# Patient Record
Sex: FEMALE | Race: ASIAN | Employment: PART TIME | URBAN - METROPOLITAN AREA
[De-identification: names, ages, dates, MRNs, and addresses within clinical notes are randomized per-mention and may not be internally consistent; named-entity substitution may affect disease eponyms.]

---

## 2018-01-22 ENCOUNTER — OFFICE VISIT (OUTPATIENT)
Dept: INTERNAL MEDICINE CLINIC | Age: 23
End: 2018-01-22

## 2018-01-22 VITALS
OXYGEN SATURATION: 100 % | HEART RATE: 83 BPM | WEIGHT: 119 LBS | DIASTOLIC BLOOD PRESSURE: 67 MMHG | TEMPERATURE: 97.8 F | HEIGHT: 63 IN | RESPIRATION RATE: 16 BRPM | SYSTOLIC BLOOD PRESSURE: 117 MMHG | BODY MASS INDEX: 21.09 KG/M2

## 2018-01-22 DIAGNOSIS — K59.00 CONSTIPATION, UNSPECIFIED CONSTIPATION TYPE: ICD-10-CM

## 2018-01-22 DIAGNOSIS — R10.2 PELVIC PAIN: ICD-10-CM

## 2018-01-22 DIAGNOSIS — R19.7 DIARRHEA, UNSPECIFIED TYPE: ICD-10-CM

## 2018-01-22 DIAGNOSIS — R10.9 ABDOMINAL PAIN, UNSPECIFIED ABDOMINAL LOCATION: Primary | ICD-10-CM

## 2018-01-22 LAB
BILIRUB UR QL STRIP: NEGATIVE
GLUCOSE UR-MCNC: NEGATIVE MG/DL
KETONES P FAST UR STRIP-MCNC: NEGATIVE MG/DL
PH UR STRIP: 7 [PH] (ref 4.6–8)
PROT UR QL STRIP: NEGATIVE
SP GR UR STRIP: 1.02 (ref 1–1.03)
UA UROBILINOGEN AMB POC: NORMAL (ref 0.2–1)
URINALYSIS CLARITY POC: CLEAR
URINALYSIS COLOR POC: YELLOW
URINE BLOOD POC: NORMAL
URINE LEUKOCYTES POC: NEGATIVE
URINE NITRITES POC: NEGATIVE

## 2018-01-22 RX ORDER — IBUPROFEN 200 MG
TABLET ORAL
COMMUNITY

## 2018-01-22 RX ORDER — LUBIPROSTONE 8 UG/1
8 CAPSULE, GELATIN COATED ORAL 2 TIMES DAILY WITH MEALS
Qty: 60 CAP | Refills: 0 | Status: SHIPPED | OUTPATIENT
Start: 2018-01-22 | End: 2020-04-16

## 2018-01-22 NOTE — PATIENT INSTRUCTIONS
Irritable Bowel Syndrome: Care Instructions  Your Care Instructions  Irritable bowel syndrome, or IBS, is a problem with the intestines that causes belly pain, bloating, gas, constipation, and diarrhea. The cause of IBS is not well known. IBS can last for many years, but it does not get worse over time or lead to serious disease. Most people can control their symptoms by changing their diet and reducing stress. Follow-up care is a key part of your treatment and safety. Be sure to make and go to all appointments, and call your doctor if you are having problems. It's also a good idea to know your test results and keep a list of the medicines you take. How can you care for yourself at home? · For constipation:  ¨ Include fruits, vegetables, beans, and whole grains in your diet each day. These foods are high in fiber. ¨ Drink plenty of fluids, enough so that your urine is light yellow or clear like water. If you have kidney, heart, or liver disease and have to limit fluids, talk with your doctor before you increase the amount of fluids you drink. ¨ Get some exercise every day. Build up slowly to 30 to 60 minutes a day on 5 or more days of the week. ¨ Take a fiber supplement, such as Citrucel or Metamucil, every day if needed. Read and follow all instructions on the label. ¨ Schedule time each day for a bowel movement. Having a daily routine may help. Take your time and do not strain when having a bowel movement. · If you often have diarrhea, limit foods and drinks that make it worse. These are different for each person but may include caffeine (found in coffee, tea, chocolate, and cola drinks), alcohol, fatty foods, gas-producing foods (such as beans, cabbage, and broccoli), some dairy products, and spicy foods. Do not eat candy or gum that contains sorbitol. · Keep a daily diary of what you eat and what symptoms you have. This may help find foods that cause you problems. · Eat slowly.  Try to make mealtime relaxing. · Find ways to reduce stress. · Get at least 30 minutes of exercise on most days of the week. Exercise can help reduce tension and prevent constipation. Walking is a good choice. You also may want to do other activities, such as running, swimming, cycling, or playing tennis or team sports. When should you call for help? Call your doctor now or seek immediate medical care if:  ? · Your pain is different than usual or occurs with fever. ? · You lose weight without trying, or you lose your appetite and you do not know why.   ? · Your symptoms often wake you from sleep. ? · Your stools are black and tarlike or have streaks of blood. ? Watch closely for changes in your health, and be sure to contact your doctor if:  ? · Your IBS symptoms get worse or begin to disrupt your day-to-day life. ? · You become more tired than usual.   ? · Your home treatment stops working. Where can you learn more? Go to http://nannette-maddie.info/. Enter Y423 in the search box to learn more about \"Irritable Bowel Syndrome: Care Instructions. \"  Current as of: May 12, 2017  Content Version: 11.4  © 4070-9556 [employer]. Care instructions adapted under license by [employer] (which disclaims liability or warranty for this information). If you have questions about a medical condition or this instruction, always ask your healthcare professional. [address] any warranty or liability for your use of this information.

## 2018-01-22 NOTE — MR AVS SNAPSHOT
[address]  [place] [address] 
[phone] Patient: Gianna Adams MRN: [id number] [facility]:4/72/1902 Visit Information Date & Time Provider Department Dept. Phone Encounter #  
 1/22/2018 10:30 AM Jennifer Seymour NP [facility] [phone] [id number] Follow-up Instructions Return in about 4 weeks (around 2/19/2018), or if symptoms worsen or fail to improve. Upcoming Health Maintenance Date Due Hepatitis A Peds Age 1-18 (1 of 2 - Standard Series) 5/13/1998 DTaP/Tdap/Td series (1 - Tdap) 5/13/2004 HPV AGE 9Y-26Y (1 of 3 - Female 3 Dose Series) 5/13/2008 Influenza Age 5 to Adult 8/1/2017 Allergies as of 1/22/2018  Review Complete On: 1/22/2018 By: Jennifer Seymour NP No Known Allergies Current Immunizations  Never Reviewed No immunizations on file. Not reviewed this visit You Were Diagnosed With   
  
 Codes Comments Abdominal pain, unspecified abdominal location    -  Primary ICD-10-CM: R10.9 ICD-9-CM: 789.00 Diarrhea, unspecified type     ICD-10-CM: R19.7 ICD-9-CM: 787.91 Constipation, unspecified constipation type     ICD-10-CM: K59.00 ICD-9-CM: 564.00 Pelvic pain     ICD-10-CM: R10.2 ICD-9-CM: [id number] Vitals BP Pulse Temp Resp Height(growth percentile) Weight(growth percentile)  
 117/67 (BP 1 Location: Right arm, BP Patient Position: Sitting) 83 97.8 °F (36.6 °C) (Oral) 16 5' 3\" (1.6 m) 119 lb (54 kg) LMP SpO2 BMI OB Status Smoking Status 01/15/2018 (Approximate) 100% 21.08 kg/m2 Having regular periods Never Smoker BMI and BSA Data Body Mass Index Body Surface Area 21.08 kg/m 2 1.55 m 2 Your Updated Medication List  
  
   
This list is accurate as of: 1/22/18 11:41 AM.  Always use your most recent med list. ADVIL 200 mg tablet Generic drug:  ibuprofen Take  by mouth. linaclotide 145 mcg Cap capsule Commonly known as:  Lenny Roch Take 1 Cap by mouth Daily (before breakfast). lubiPROStone 8 mcg capsule Commonly known as:  Bhavana Tim Take 1 Cap by mouth two (2) times daily (with meals). Indications: chronic idiopathic constipation Prescriptions Printed Refills  
 linaclotide (LINZESS) 145 mcg cap capsule 3 Sig: Take 1 Cap by mouth Daily (before breakfast). Class: Print Route: Oral  
 lubiPROStone (AMITIZA) 8 mcg capsule 0 Sig: Take 1 Cap by mouth two (2) times daily (with meals). Indications: chronic idiopathic constipation Class: Print Route: Oral  
  
We Performed the Following AMB POC URINALYSIS DIP STICK AUTO W/O MICRO [96911 CPT(R)] Follow-up Instructions Return in about 4 weeks (around 2/19/2018), or if symptoms worsen or fail to improve. Patient Instructions Irritable Bowel Syndrome: Care Instructions Your Care Instructions Irritable bowel syndrome, or IBS, is a problem with the intestines that causes belly pain, bloating, gas, constipation, and diarrhea. The cause of IBS is not well known. IBS can last for many years, but it does not get worse over time or lead to serious disease. Most people can control their symptoms by changing their diet and reducing stress. Follow-up care is a key part of your treatment and safety. Be sure to make and go to all appointments, and call your doctor if you are having problems. It's also a good idea to know your test results and keep a list of the medicines you take. How can you care for yourself at home? · For constipation: 
¨ Include fruits, vegetables, beans, and whole grains in your diet each day. These foods are high in fiber. ¨ Drink plenty of fluids, enough so that your urine is light yellow or clear like water.  If you have kidney, heart, or liver disease and have to limit fluids, talk with your doctor before you increase the amount of fluids you drink. ¨ Get some exercise every day. Build up slowly to 30 to 60 minutes a day on 5 or more days of the week. ¨ Take a fiber supplement, such as Citrucel or Metamucil, every day if needed. Read and follow all instructions on the label. ¨ Schedule time each day for a bowel movement. Having a daily routine may help. Take your time and do not strain when having a bowel movement. · If you often have diarrhea, limit foods and drinks that make it worse. These are different for each person but may include caffeine (found in coffee, tea, chocolate, and cola drinks), alcohol, fatty foods, gas-producing foods (such as beans, cabbage, and broccoli), some dairy products, and spicy foods. Do not eat candy or gum that contains sorbitol. · Keep a daily diary of what you eat and what symptoms you have. This may help find foods that cause you problems. · Eat slowly. Try to make mealtime relaxing. · Find ways to reduce stress. · Get at least 30 minutes of exercise on most days of the week. Exercise can help reduce tension and prevent constipation. Walking is a good choice. You also may want to do other activities, such as running, swimming, cycling, or playing tennis or team sports. When should you call for help? Call your doctor now or seek immediate medical care if: 
? · Your pain is different than usual or occurs with fever. ? · You lose weight without trying, or you lose your appetite and you do not know why.  
? · Your symptoms often wake you from sleep. ? · Your stools are black and tarlike or have streaks of blood. ? Watch closely for changes in your health, and be sure to contact your doctor if: 
? · Your IBS symptoms get worse or begin to disrupt your day-to-day life. ? · You become more tired than usual.  
? · Your home treatment stops working. Where can you learn more? Go to http://nannette-maddie.info/.  
Enter W130 in the search box to learn more about \"Irritable Bowel Syndrome: Care Instructions. \" Current as of: May 12, 2017 Content Version: 11.4 © 2073-9615 [employer]. Care instructions adapted under license by [employer] (which disclaims liability or warranty for this information). If you have questions about a medical condition or this instruction, always ask your healthcare professional. [address]  any warranty or liability for your use of this information. Introducing [facility]! Shila De La Rosa introduces [employer] patient portal. Now you can access parts of your medical record, email your doctor's office, and request medication refills online. 1. In your internet browser, go to [email or web address] 2. Click on the First Time User? Click Here link in the Sign In box. You will see the New Member Sign Up page. 3. Enter your [employer] Access Code exactly as it appears below. You will not need to use this code after youve completed the sign-up process. If you do not sign up before the expiration date, you must request a new code. · [employer] Access Code: [email or web address] Expires: 4/22/2018 11:07 AM 
 
4. Enter the last four digits of your Social Security Number (xxxx) and Date of Birth (mm/dd/yyyy) as indicated and click Submit. You will be taken to the next sign-up page. 5. Create a Accruit ID. This will be your Accruit login ID and cannot be changed, so think of one that is secure and easy to remember. 6. Create a [employer] password. You can change your password at any time. 7. Enter your Password Reset Question and Answer. This can be used at a later time if you forget your password. 8. Enter your e-mail address. You will receive e-mail notification when new information is available in [address] 9. Click Sign Up. You can now view and download portions of your medical record. 10. Click the Download Summary menu link to download a portable copy of your medical information. If you have questions, please visit the Frequently Asked Questions section of the [employer] website. Remember, [employer] is NOT to be used for urgent needs. For medical emergencies, dial 911. Now available from your iPhone and Android! Please provide this summary of care documentation to your next provider. If you have any questions after today's visit, please call [phone].

## 2018-01-22 NOTE — PROGRESS NOTES
RM#9  Chief Complaint   Patient presents with    Complete Physical     Results for orders placed or performed in visit on 01/22/18   AMB POC URINALYSIS DIP STICK AUTO W/O MICRO   Result Value Ref Range    Color (UA POC) Yellow     Clarity (UA POC) Clear     Glucose (UA POC) Negative Negative    Bilirubin (UA POC) Negative Negative    Ketones (UA POC) Negative Negative    Specific gravity (UA POC) 1.020 1.001 - 1.035    Blood (UA POC) Trace Negative    pH (UA POC) 7.0 4.6 - 8.0    Protein (UA POC) Negative Negative    Urobilinogen (UA POC) 0.2 mg/dL 0.2 - 1    Nitrites (UA POC) Negative Negative    Leukocyte esterase (UA POC) Negative Negative       1. Have you been to the ER, urgent care clinic since your last visit? Hospitalized since your last visit? No    2. Have you seen or consulted any other health care providers outside of the [address] [place] [address] since your last visit? Include any pap smears or colon screening. No  There are no preventive care reminders to display for this patient. Health Maintenance reviewed - n/a.

## 2018-01-29 NOTE — PROGRESS NOTES
HISTORY OF PRESENT ILLNESS  Phong hutton a 23 y.o. female presents to establish care  HPI  Abdominal bloating, abdominal pain daily,  diarrhea and constipation for last few months. changing diet; more fiber and healthier foods, this work temporarily    poor appetite     Had digestive problems as child especially constipation. 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 Lives at home with parents. Pharmacy student at [employer]    She does not drink nor smoke, adequate sleep. Denies depression,  +situational anxiety        History reviewed. No pertinent past medical history. No current outpatient prescriptions on file prior to visit. No current facility-administered medications on file prior to visit. History reviewed. No pertinent surgical history. Family History   Problem Relation Age of Onset    Diabetes Father     No Known Problems Brother     No Known Problems Maternal Grandmother      ROS  Negative except as noted in HPI  Physical Exam  Well appearing female in NAD  Skin: normal  HEENT: glasses, good dentition  Neck: no adenopathy or thyromegaly, supple  Lungs: CTA  Heart: normal rate and rhythm, no m/r/g, no edema,   Abdomen: symmetrical, normo active bowel sounds, soft, non tender, no palpable lesion  Extremities:  Symmetrical, no deformity, full ROM  Psy; anxious, cooperative, friendly      Visit Vitals    /67 (BP 1 Location: Right arm, BP Patient Position: Sitting)    Pulse 83    Temp 97.8 °F (36.6 °C) (Oral)    Resp 16    Ht 5' 3\" (1.6 m)    Wt 119 lb (54 kg)    LMP 01/15/2018 (Approximate)    SpO2 100%    BMI 21.08 kg/m2     ASSESSMENT and PLAN    ICD-10-CM ICD-9-CM    1. Abdominal pain, unspecified abdominal location R10.9 789.00 lubiPROStone (AMITIZA) 8 mcg capsule   2. Diarrhea, unspecified type R19.7 787.91    3. Constipation, unspecified constipation type K59.00 564.00 lubiPROStone (AMITIZA) 8 mcg capsule   4. Pelvic pain R10.2 [id number] AMB POC URINALYSIS DIP STICK AUTO W/O MICRO     Follow-up Disposition:  Return in about 4 weeks (around 2/19/2018), or if symptoms worsen or fail to improve. reviewed diet, exercise and weight control  reviewed medications and side effects in detail    Discussed likely IBS, treatment options, referral. She has tried high fiber without success. Trial of above medication, advised of possible high cost of medication    Patient voices understanding and acceptance of this advice and will call back if any further questions or concerns. An After Visit Summary was printed and given to the patient.     Negative urine dip

## 2020-04-14 ENCOUNTER — E-VISIT (OUTPATIENT)
Dept: INTERNAL MEDICINE CLINIC | Age: 25
End: 2020-04-14

## 2020-04-14 ENCOUNTER — TELEPHONE (OUTPATIENT)
Dept: INTERNAL MEDICINE CLINIC | Age: 25
End: 2020-04-14

## 2020-04-14 NOTE — TELEPHONE ENCOUNTER
E-visit submitted for period pains, as well as gassy stomach cramping. Patient would be better served by having a true visit as I am not able to answer her questions well just through a ApplyMap. Please call and schedule virtual visit.      Thanks  Carmen Farrell MD

## 2020-04-16 ENCOUNTER — VIRTUAL VISIT (OUTPATIENT)
Dept: INTERNAL MEDICINE CLINIC | Age: 25
End: 2020-04-16

## 2020-04-16 DIAGNOSIS — L65.9 HAIR LOSS: ICD-10-CM

## 2020-04-16 DIAGNOSIS — R63.4 WEIGHT LOSS: ICD-10-CM

## 2020-04-16 DIAGNOSIS — R53.83 FATIGUE, UNSPECIFIED TYPE: ICD-10-CM

## 2020-04-16 DIAGNOSIS — R10.13 EPIGASTRIC PAIN: Primary | ICD-10-CM

## 2020-04-16 RX ORDER — PANTOPRAZOLE SODIUM 40 MG/1
40 TABLET, DELAYED RELEASE ORAL DAILY
Qty: 60 TAB | Refills: 0 | Status: SHIPPED | OUTPATIENT
Start: 2020-04-16 | End: 2020-06-09

## 2020-04-16 NOTE — PROGRESS NOTES
Zach Salinas is a 27 y.o. female who was seen by synchronous (real-time) audio-video technology on 4/16/2020. Pursuant to the emergency declaration under the [address] and [place] [address] [facility] authority and the [employer] Act, this Virtual Visit was conducted, with patient's consent, to reduce the patient's risk of exposure to COVID-19 and provide continuity of care for an established patient. Services were provided through a video synchronous discussion virtually to substitute for in-person appointment. Consent:  She and/or her healthcare decision maker is aware that this patient-initiated Telehealth encounter is a billable service, with coverage as determined by her insurance carrier. She is aware that she may receive a bill and has provided verbal consent to proceed: Yes    I was in the office while conducting this encounter. Assessment & Plan:   Diagnoses and all orders for this visit:    1. Epigastric pain  -     pantoprazole (PROTONIX) 40 mg tablet; Take 1 Tab by mouth daily.  -     CBC W/O DIFF  -     TSH RFX ON ABNORMAL TO FREE T4  -     METABOLIC PANEL, COMPREHENSIVE  -     IRON PROFILE  -     FERRITIN  -     LIPASE    2. Hair loss  -     CBC W/O DIFF  -     TSH RFX ON ABNORMAL TO FREE T4  -     METABOLIC PANEL, COMPREHENSIVE  -     IRON PROFILE  -     FERRITIN  -     LIPASE    3. Weight loss  -     CBC W/O DIFF  -     TSH RFX ON ABNORMAL TO FREE T4  -     METABOLIC PANEL, COMPREHENSIVE  -     IRON PROFILE  -     FERRITIN  -     LIPASE    4. Fatigue, unspecified type  -     CBC W/O DIFF  -     TSH RFX ON ABNORMAL TO FREE T4  -     METABOLIC PANEL, COMPREHENSIVE  -     IRON PROFILE  -     FERRITIN  -     LIPASE    Epigastric pain: discussed that differential is broad. However does seem to be digestive in nature. - trial treatment with PPI. - screen for other sources of pain as noted above.      History of anemia hair loss and weight loss   - screen for thyroid abn, and iron deficiency. 712  Subjective:   Sunitha Rojas was seen for Abdominal Pain (Started about 2 days ago, \"felt like a buring sharp pain in the middle of stomach\" 8/10, pain comes and goes. Denies any vomiting but states when pain comes it causes some nausea)    Reports that she is having abdominal pain ongoing for 2 days. - started Monday night, woke up around 4am with severe pain, then kept increasing Monday-Tuesday. Period started today. Pain has diminished by a lot. No sexual partners. Feeling pain up towards the umbilicus. Pain was steady for first few days then became more like waves. And associated with eating. Pain also happenned in January also around the same time that week. No vomiting. But ++ nausea. Often will get nausea and also uncomfortable pain in stomach. Bowel movements have been normal for her. Pain a little relieved by bowel movements. Did feel some radiating into chest.     Has not seen any blood in stool, nor black stools. Thinks she has been losing weight. Not trying to lose weight. Current weight may be 104 pounds - trying to eat more. Tried taking tums and this did seem to help. Takes ibuprofen rarely for     Prior to Admission medications    Medication Sig Start Date End Date Taking? Authorizing Provider   ibuprofen (ADVIL) 200 mg tablet Take  by mouth. Provider, Historical   linaclotide (LINZESS) 145 mcg cap capsule Take 1 Cap by mouth Daily (before breakfast). 1/22/18   Tati Gonzalez NP   lubiPROStone (AMITIZA) 8 mcg capsule Take 1 Cap by mouth two (2) times daily (with meals). Indications: chronic idiopathic constipation 1/22/18   Majo SCHREIBER, NP     No Known Allergies    Review of Systems   Constitutional: Negative for chills, fever and malaise/fatigue. Respiratory: Negative for shortness of breath. Cardiovascular: Negative for chest pain.      PHYSICAL EXAMINATION:    Vital Signs: (As obtained by patient/caregiver at home)  Visit Vitals  LMP 04/16/2020 Comment: last cycle 3/22/20         Constitutional: [x] Appears well-developed and well-nourished [x] No apparent distress      Mental status: [x] Alert and awake  [x] Oriented to person/place/time [x] Able to follow commands      Eyes:   EOM    [x]  Normal      Sclera  [x]  Normal              Discharge [x]  None visible       HENT: [x] Normocephalic, atraumatic    [x] Mouth/Throat: Mucous membranes are moist    External Ears [x] Normal      Neck: [x] No visualized mass     Pulmonary/Chest: [x] Respiratory effort normal   [x] No visualized signs of difficulty breathing or respiratory distress         Musculoskeletal:   [x] Normal gait with no signs of ataxia         [x] Normal range of motion of neck        Neurological:        [x] No Facial Asymmetry (Cranial nerve 7 motor function) (limited exam due to video visit)          [x] No gaze palsy              Skin:        [x] No significant exanthematous lesions or discoloration noted on facial skin              Psychiatric:       [x] Normal Affect        [x] Normal behavior    Other pertinent observable physical exam findings:- none    We discussed the expected course, resolution and complications of the diagnosis(es) in detail. Medication risks, benefits, costs, interactions, and alternatives were discussed as indicated. I advised her to contact the office if her condition worsens, changes or fails to improve as anticipated. She expressed understanding with the diagnosis(es) and plan. Pursuant to the emergency declaration under the [facility], [facility] waiver authority and the [employer] and [employer] Act, this Virtual  Visit was conducted, with patient's consent, to reduce the patient's risk of exposure to COVID-19 and provide continuity of care for an established patient.      Services were provided through a video synchronous discussion virtually to substitute for in-person clinic visit.     Romayne Cord, MD

## 2020-04-16 NOTE — PATIENT INSTRUCTIONS
I am glad we were able to meet earlier today. Please call and schedule your follow-up appointment soon so you are able to get the date and time you need. I recommend:  
1) for stomach pain: trial treatment with a stomach acid supression medication. I have sent pantoprazole 40mg to pharmacy. 2) [facility] Please do not hesitate to contact the office if any new questions or concerns. All the best,  
  
   Dr. Silas Olivo Gastritis: Care Instructions Your Care Instructions Gastritis is a sore and upset stomach. It happens when something irritates the stomach lining. Many things can cause it. These include an infection such as the flu or something you ate or drank. Medicines or a sore on the lining of the stomach (ulcer) also can cause it. Your belly may bloat and ache. You may belch, vomit, and feel sick to your stomach. You should be able to relieve the problem by taking medicine. And it may help to change your diet. If gastritis lasts, your doctor may prescribe medicine. Follow-up care is a key part of your treatment and safety. Be sure to make and go to all appointments, and call your doctor if you are having problems. It's also a good idea to know your test results and keep a list of the medicines you take. How can you care for yourself at home? · If your doctor prescribed antibiotics, take them as directed. Do not stop taking them just because you feel better. You need to take the full course of antibiotics. · Be safe with medicines. If your doctor prescribed medicine to decrease stomach acid, take it as directed. Call your doctor if you think you are having a problem with your medicine.  
· Do not take any other medicine, including over-the-counter pain relievers, without talking to your doctor first. 
· If your doctor recommends over-the-counter medicine to reduce stomach acid, such as Pepcid AC, Prilosec, Tagamet HB, or Zantac 75, follow the directions on the label. · Drink plenty of fluids (enough so that your urine is light yellow or clear like water) to prevent dehydration. Choose water and other caffeine-free clear liquids. If you have kidney, heart, or liver disease and have to limit fluids, talk with your doctor before you increase the amount of fluids you drink. · Limit how much alcohol you drink. · Avoid coffee, tea, cola drinks, chocolate, and other foods with caffeine. They increase stomach acid. When should you call for help? Call 911 anytime you think you may need emergency care. For example, call if: 
  · You vomit blood or what looks like coffee grounds.  
  · You pass maroon or very bloody stools.  
 Call your doctor now or seek immediate medical care if: 
  · You start breathing fast and have not produced urine in the last 8 hours.  
  · You cannot keep fluids down.  
 Watch closely for changes in your health, and be sure to contact your doctor if: 
  · You do not get better as expected. Where can you learn more? Go to http://nannette-maddie.info/ Enter [id number] in the search box to learn more about \"Gastritis: Care Instructions. \" Current as of: August 11, 2019Content Version: 12.4 © 8485-3785 [employer]wise, [employer]orporated. Care instructions adapted under license by [employer] (which disclaims liability or warranty for this information). If you have questions about a medical condition or this instruction, always ask your healthcare professional. [place] [address] any warranty or liability for your use of this information.

## 2020-04-16 NOTE — PROGRESS NOTES
Doxy. Me    Chief Complaint   Patient presents with    Abdominal Pain     Started about 2 days ago, \"felt like a buring sharp pain in the middle of stomach\" 8/10, pain comes and goes. Denies any vomiting but states when pain comes it causes some nausea         1. Have you been to the ER, urgent care clinic since your last visit? Hospitalized since your last visit? No    2. Have you seen or consulted any other health care providers outside of the [address] [place] [address] since your last visit? Include any pap smears or colon screening.  No        Health Maintenance Due   Topic Date Due    HPV Age 9Y-34Y (1 - 2-dose series) 05/13/2008    DTaP/Tdap/Td series (1 - Tdap) 05/13/2018    PAP AKA CERVICAL CYTOLOGY  05/13/2018           3 most recent PHQ Screens 4/16/2020   Little interest or pleasure in doing things Not at all   Feeling down, depressed, irritable, or hopeless Not at all   Total Score PHQ 2 0           Learning Assessment 4/16/2020   PRIMARY LEARNER Patient   BARRIERS PRIMARY LEARNER NONE   CO-LEARNER CAREGIVER No   PRIMARY LANGUAGE ENGLISH   LEARNER PREFERENCE PRIMARY DEMONSTRATION     READING   ANSWERED BY patient   RELATIONSHIP SELF

## 2020-04-17 LAB
ALBUMIN SERPL-MCNC: 4.5 G/DL (ref 3.9–5)
ALBUMIN/GLOB SERPL: 1.9 {RATIO} (ref 1.2–2.2)
ALP SERPL-CCNC: 64 IU/L (ref 39–117)
ALT SERPL-CCNC: 9 IU/L (ref 0–32)
AST SERPL-CCNC: 13 IU/L (ref 0–40)
BILIRUB SERPL-MCNC: 0.4 MG/DL (ref 0–1.2)
BUN SERPL-MCNC: 11 MG/DL (ref 6–20)
BUN/CREAT SERPL: 17 (ref 9–23)
CALCIUM SERPL-MCNC: 9.3 MG/DL (ref 8.7–10.2)
CHLORIDE SERPL-SCNC: 101 MMOL/L (ref 96–106)
CO2 SERPL-SCNC: 22 MMOL/L (ref 20–29)
CREAT SERPL-MCNC: 0.66 MG/DL (ref 0.57–1)
ERYTHROCYTE [DISTWIDTH] IN BLOOD BY AUTOMATED COUNT: 11.4 % (ref 11.7–15.4)
FERRITIN SERPL-MCNC: 36 NG/ML (ref 15–150)
GLOBULIN SER CALC-MCNC: 2.4 G/DL (ref 1.5–4.5)
GLUCOSE SERPL-MCNC: 104 MG/DL (ref 65–99)
HCT VFR BLD AUTO: 35.9 % (ref 34–46.6)
HGB BLD-MCNC: 12.2 G/DL (ref 11.1–15.9)
IRON SATN MFR SERPL: 23 % (ref 15–55)
IRON SERPL-MCNC: 68 UG/DL (ref 27–159)
LIPASE SERPL-CCNC: 25 U/L (ref 14–72)
MCH RBC QN AUTO: 28.9 PG (ref 26.6–33)
MCHC RBC AUTO-ENTMCNC: 34 G/DL (ref 31.5–35.7)
MCV RBC AUTO: 85 FL (ref 79–97)
PLATELET # BLD AUTO: 280 X10E3/UL (ref 150–450)
POTASSIUM SERPL-SCNC: 3.8 MMOL/L (ref 3.5–5.2)
PROT SERPL-MCNC: 6.9 G/DL (ref 6–8.5)
RBC # BLD AUTO: 4.22 X10E6/UL (ref 3.77–5.28)
SODIUM SERPL-SCNC: 140 MMOL/L (ref 134–144)
TIBC SERPL-MCNC: 302 UG/DL (ref 250–450)
TSH SERPL DL<=0.005 MIU/L-ACNC: 1.2 UIU/ML (ref 0.45–4.5)
UIBC SERPL-MCNC: 234 UG/DL (ref 131–425)
WBC # BLD AUTO: 9.5 X10E3/UL (ref 3.4–10.8)

## 2020-04-17 NOTE — PROGRESS NOTES
Normal labs. No iron deficiency, no anemia. Congratulations. Please continue current medication, I am hopeful this will help your pain. (pantoprazole).

## 2020-06-08 DIAGNOSIS — R10.13 EPIGASTRIC PAIN: ICD-10-CM

## 2020-06-09 RX ORDER — PANTOPRAZOLE SODIUM 20 MG/1
20 TABLET, DELAYED RELEASE ORAL DAILY
Qty: 30 TAB | Refills: 1 | Status: SHIPPED | OUTPATIENT
Start: 2020-06-09 | End: 2020-07-08

## 2020-06-10 ENCOUNTER — VIRTUAL VISIT (OUTPATIENT)
Dept: INTERNAL MEDICINE CLINIC | Age: 25
End: 2020-06-10

## 2020-06-10 DIAGNOSIS — D22.70: Primary | ICD-10-CM

## 2020-06-10 DIAGNOSIS — K21.9 GASTROESOPHAGEAL REFLUX DISEASE WITHOUT ESOPHAGITIS: ICD-10-CM

## 2020-06-10 DIAGNOSIS — G43.829 MENSTRUAL MIGRAINE WITHOUT STATUS MIGRAINOSUS, NOT INTRACTABLE: ICD-10-CM

## 2020-06-10 NOTE — PATIENT INSTRUCTIONS
I am glad we were able to meet earlier today. Please call and schedule your follow-up appointment soon so you are able to get the date and time you need. We discussed:  
 - okay to take 100-200 mg of magnesium per day to see if this helps migraines. If not, and continuing to have more than 2 per month, please let me know 
 - take pantoprazole 20 mg if taking ibuprofen for stomach protection. - okay to take pantoprazole as needed since pains resolved. - increase sleep and hydration to help reduce migraines  
 - follow-up with dermatology for possible ungual nevus vs toenail fungus. [facility] dermatology:  
[address] [facility], [address] Phone: [phone] [facility] of [address] [place] [address] Se:  
[address] Phone [phone] Fax [phone] [facility] dermatology:  
[address] [place] [address] Phone: [phone] Please do not hesitate to contact the office if any new questions or concerns. All the best,  
  
   Dr. Kimberlyn Moore

## 2020-06-10 NOTE — PROGRESS NOTES
Elizabeth Le is a 23 y.o. female who was seen by synchronous (real-time) audio-video technology on 6/10/2020. Consent: Elizabeth Le, who was seen by synchronous (real-time) audio-video technology, and/or her healthcare decision maker, is aware that this patient-initiated, Telehealth encounter on 6/10/2020 is a billable service, with coverage as determined by her insurance carrier. She is aware that she may receive a bill and has provided verbal consent to proceed: Yes. Assessment & Plan:   Diagnoses and all orders for this visit:    1. Nevus of toe, unspecified laterality    2. Gastroesophageal reflux disease without esophagitis    3. Menstrual migraine without status migrainosus, not intractable      Menstrual migraine: typically occuring just one day prior to onset of menses. Now with headache more than 1/2 the days this past week. - work on sleep habits, increased hydration. - discussed taking pantoprazole with ibuprofen to avoid recurrence of gastritis. GERD symptomatically resolved. Continue 20mg pantoprazole prn. And with nsaid use. Discussed:      - okay to take 100-200 mg of magnesium per day to see if this helps migraines. If not, and continuing to have more than 2 per month, please let me know   - take pantoprazole 20 mg if taking ibuprofen for stomach protection. - okay to take pantoprazole as needed since pains resolved. - increase sleep and hydration to help reduce migraines    - follow-up with dermatology for possible ungual nevus vs toenail fungus. Follow-up and Dispositions    · Return in about 4 months (around 10/10/2020) for well adult visit likely with pap. (Please also see details in patient instructions)  Subjective:   Elizabeth Le is a 23 y.o. female who was seen for No chief complaint on file. Reports that she has been on the pantoprazole 40mg. Her stomach pain has been much improved. Did stop taking it around 1.5 weeks ago.    Felt like she did not need the medication anymore. Noticing symptoms not at all in the past week. Has had some increased migraines over the past few years. Have started with evening migraines around 6 pm in the evening. Did take advil for this most nights. Thinks it may be triggered by change in coffee creamer. In the past had migraine 1 day before period. - notes that one of her toes has had had a darker black in her nailbed. Not growing nor changing. Covers the entired nail bed. Nail is not flaky, nor      Prior to Admission medications    Medication Sig Start Date End Date Taking? Authorizing Provider   pantoprazole (PROTONIX) 20 mg tablet Take 1 Tab by mouth daily. 6/9/20   Jossue Marcelino MD   ibuprofen (ADVIL) 200 mg tablet Take  by mouth. Provider, Historical     No Known Allergies    Social History     Tobacco Use    Smoking status: Never Smoker    Smokeless tobacco: Never Used   Substance Use Topics    Alcohol use: No       Review of Systems   Constitutional: Negative for chills, fever, malaise/fatigue and weight loss. HENT: Negative for congestion. Respiratory: Negative for cough, shortness of breath and wheezing. Cardiovascular: Negative for chest pain and leg swelling. Gastrointestinal: Negative for abdominal pain, diarrhea, nausea and vomiting. Genitourinary: Negative for dysuria. Skin: Negative for rash. Neurological: Negative for dizziness and headaches. Objective:   Vital Signs: (As obtained by patient/caregiver at home)  There were no vitals taken for this visit.      PHYSICAL EXAMINATION:    Constitutional: [x] Appears well-developed and well-nourished [x] No apparent distress      Mental status: [x] Alert and awake  [x] Oriented to person/place/time [x] Able to follow commands      Eyes:   EOM    [x]  Normal      Sclera  [x]  Normal              Discharge [x]  None visible       HENT: [x] Normocephalic, atraumatic    [x] Mouth/Throat: Mucous membranes are moist    External Ears [x] Normal      Neck: [x] No visualized mass     Pulmonary/Chest: [x] Respiratory effort normal   [x] No visualized signs of difficulty breathing or respiratory distress         Musculoskeletal:   [x] Normal gait with no signs of ataxia         [x] Normal range of motion of neck        Neurological:        [x] No Facial Asymmetry (Cranial nerve 7 motor function) (limited exam due to video visit)          [x] No gaze palsy              Skin:        [x] No significant exanthematous lesions or discoloration noted on facial skin              Psychiatric:       [x] Normal Affect        [x] Normal behavior    Other pertinent observable physical exam findings:- none      We discussed the expected course, resolution and complications of the diagnosis(es) in detail. Medication risks, benefits, costs, interactions, and alternatives were discussed as indicated. I advised her to contact the office if her condition worsens, changes or fails to improve as anticipated. She expressed understanding with the diagnosis(es) and plan. Elizabeth Le is a 23 y.o. female who was evaluated by a video visit encounter for concerns as above. Patient identification was verified prior to start of the visit. A caregiver was present when appropriate. Due to this being a TeleHealth encounter (During [place] [address] public health emergency), evaluation of the following organ systems was limited: Vitals/Constitutional/EENT/Resp/CV/GI//MS/Neuro/Skin/Heme-Lymph-Imm. Pursuant to the emergency declaration under the [facility], [facility]5 waiver authority and the [employer] and [employer] General Act, this Virtual  Visit was conducted, with patient's (and/or legal guardian's) consent, to reduce the patient's risk of exposure to COVID-19 and provide necessary medical care.      Services were provided through a video synchronous discussion virtually to substitute for in-person clinic visit. Patient was located at their home. Provider was in office.        Felipa Zhu MD

## 2020-07-07 DIAGNOSIS — R10.13 EPIGASTRIC PAIN: ICD-10-CM

## 2020-07-08 RX ORDER — PANTOPRAZOLE SODIUM 20 MG/1
20 TABLET, DELAYED RELEASE ORAL
Qty: 30 TAB | Refills: 1 | Status: SHIPPED | OUTPATIENT
Start: 2020-07-08 | End: 2020-08-09

## 2020-08-04 ENCOUNTER — PATIENT MESSAGE (OUTPATIENT)
Dept: INTERNAL MEDICINE CLINIC | Age: 25
End: 2020-08-04

## 2020-08-05 NOTE — TELEPHONE ENCOUNTER
Recommending in person appointment if pain not improving with protonix. MD Dimitri Moctezuma,   If your pain is coming from gastritis (stomach inflammation), it is not abnormal to have recurrent problems. The underlying irritation, be that increased stress, medications such as ibuprofen, irritating foods etc, would still be present. If the pain resolves with taking pantoprazle (protonix) than I am not sure there will be much benefit to go look for gallbladder disease. If the pain does not resolve with stomach acid medications, or if it seems to be more located on the right side, than I would definitely agree with further evaluation such as an ultrasound of the gallbladder and liver. Gallbladder disease can happen at any time in life, but is a little more common as we approach middle age. It is often exacerbated by fatty foods.      Hope you find this information helpful. An inperson visit is often useful in determining if there may be gallbaldder inflammation (abdominal exam can be very informative).       - Kings Babb\"

## 2020-08-05 NOTE — TELEPHONE ENCOUNTER
From: Britt Brand  To: Estiven Gordon MD  Sent: 8/4/2020 1:16 PM EDT  Subject: Visit Follow-Up Question    Hi,  My symptoms have started up again this week. I've been getting stomach bloating and aches as well as feeling very nauseous most nights. I started taking the Protonix again (about 3x week this week). I also forgot to mention this in our previous E-visit appointment, that I do have these symptoms in my family history. Both my cousin and my grandmother had to get their gallbladder removed because of the same symptoms that I am having. I thought it would be important for me to mention this. Do you feel like I should continue to taking the Protonix whenever I feel pain, or should I be taking it daily again until I see these symptoms go away?

## 2020-08-09 DIAGNOSIS — R10.13 EPIGASTRIC PAIN: ICD-10-CM

## 2020-08-09 RX ORDER — PANTOPRAZOLE SODIUM 20 MG/1
20 TABLET, DELAYED RELEASE ORAL
Qty: 30 TAB | Refills: 1 | Status: SHIPPED | OUTPATIENT
Start: 2020-08-09 | End: 2020-09-11

## 2020-09-10 DIAGNOSIS — R10.13 EPIGASTRIC PAIN: ICD-10-CM

## 2020-09-11 RX ORDER — PANTOPRAZOLE SODIUM 20 MG/1
20 TABLET, DELAYED RELEASE ORAL
Qty: 30 TAB | Refills: 1 | Status: SHIPPED | OUTPATIENT
Start: 2020-09-11 | End: 2020-09-25 | Stop reason: SDUPTHER

## 2020-09-24 ENCOUNTER — OFFICE VISIT (OUTPATIENT)
Dept: INTERNAL MEDICINE CLINIC | Age: 25
End: 2020-09-24

## 2020-09-24 VITALS
HEIGHT: 63 IN | BODY MASS INDEX: 19.31 KG/M2 | TEMPERATURE: 98.1 F | HEART RATE: 76 BPM | DIASTOLIC BLOOD PRESSURE: 64 MMHG | SYSTOLIC BLOOD PRESSURE: 100 MMHG | OXYGEN SATURATION: 99 % | RESPIRATION RATE: 15 BRPM | WEIGHT: 109 LBS

## 2020-09-24 DIAGNOSIS — B36.0 TINEA VERSICOLOR: ICD-10-CM

## 2020-09-24 DIAGNOSIS — N92.1 MENOMETRORRHAGIA: Primary | ICD-10-CM

## 2020-09-24 LAB
ERYTHROCYTE [DISTWIDTH] IN BLOOD BY AUTOMATED COUNT: 11.9 % (ref 11.5–14.5)
FSH SERPL-ACNC: 4.2 MIU/ML
HCG URINE, QL. (POC): NEGATIVE
HCT VFR BLD AUTO: 41 % (ref 35–47)
HGB BLD-MCNC: 12.4 G/DL (ref 11.5–16)
IRON SATN MFR SERPL: 14 % (ref 20–50)
IRON SERPL-MCNC: 54 UG/DL (ref 35–150)
LH SERPL-ACNC: 5.9 MIU/ML
MCH RBC QN AUTO: 27 PG (ref 26–34)
MCHC RBC AUTO-ENTMCNC: 30.2 G/DL (ref 30–36.5)
MCV RBC AUTO: 89.3 FL (ref 80–99)
NRBC # BLD: 0 K/UL (ref 0–0.01)
NRBC BLD-RTO: 0 PER 100 WBC
PLATELET # BLD AUTO: 310 K/UL (ref 150–400)
PMV BLD AUTO: 10.4 FL (ref 8.9–12.9)
RBC # BLD AUTO: 4.59 M/UL (ref 3.8–5.2)
T4 FREE SERPL-MCNC: 1.1 NG/DL (ref 0.8–1.5)
TIBC SERPL-MCNC: 390 UG/DL (ref 250–450)
TSH SERPL DL<=0.05 MIU/L-ACNC: 1.92 UIU/ML (ref 0.36–3.74)
VALID INTERNAL CONTROL?: YES
WBC # BLD AUTO: 6.8 K/UL (ref 3.6–11)

## 2020-09-24 PROCEDURE — 81025 URINE PREGNANCY TEST: CPT | Performed by: INTERNAL MEDICINE

## 2020-09-24 PROCEDURE — 99214 OFFICE O/P EST MOD 30 MIN: CPT | Performed by: INTERNAL MEDICINE

## 2020-09-24 RX ORDER — KETOCONAZOLE 20 MG/ML
SHAMPOO TOPICAL
Qty: 120 ML | Refills: 0 | Status: SHIPPED | OUTPATIENT
Start: 2020-09-24

## 2020-09-24 RX ORDER — NORETHINDRONE ACETATE AND ETHINYL ESTRADIOL 1MG-20(21)
1 KIT ORAL DAILY
Qty: 84 TAB | Refills: 1 | Status: SHIPPED | OUTPATIENT
Start: 2020-09-24 | End: 2020-12-11 | Stop reason: SDUPTHER

## 2020-09-24 NOTE — PROGRESS NOTES
RM 2    Chief Complaint   Patient presents with    Irregular Menses     reports periods become irregular for past 6 months, having periods every 2-3 weeks, painful cramping with periods, reports feels lightheaded before period starts      1. Have you been to the ER, urgent care clinic since your last visit? Hospitalized since your last visit? No    2. Have you seen or consulted any other health care providers outside of the [address] [place] [address] since your last visit? Include any pap smears or colon screening. No    Health Maintenance Due   Topic Date Due    HPV Age 9Y-34Y (1 - 2-dose series) 05/13/2008    DTaP/Tdap/Td series (1 - Tdap) 05/13/2018    PAP AKA CERVICAL CYTOLOGY  05/13/2018    Flu Vaccine (1) 09/01/2020       Learning Assessment 4/16/2020   PRIMARY LEARNER Patient   BARRIERS PRIMARY LEARNER NONE   CO-LEARNER CAREGIVER No   PRIMARY LANGUAGE ENGLISH   LEARNER PREFERENCE PRIMARY DEMONSTRATION     READING   ANSWERED BY patient   RELATIONSHIP SELF     Patient reports already had flu vaccine     AVS  education, follow up, and recommendations provided and addressed with patient.  services used to advise patient no .

## 2020-09-24 NOTE — PATIENT INSTRUCTIONS
Abnormal Uterine Bleeding: Care Instructions Your Care Instructions Abnormal uterine bleeding is irregular bleeding from the uterus that is longer or heavier than usual or does not occur at your regular time. Sometimes it is caused by changes in hormone levels. It can also be caused by growths in the uterus, such as fibroids or polyps. Sometimes a cause cannot be found. You may have heavy bleeding when you are not expecting your period. Your doctor may suggest a pregnancy test, if you think you are pregnant. Follow-up care is a key part of your treatment and safety. Be sure to make and go to all appointments, and call your doctor if you are having problems. It's also a good idea to know your test results and keep a list of the medicines you take. How can you care for yourself at home? · Be safe with medicines. Take pain medicines exactly as directed. ? If the doctor gave you a prescription medicine for pain, take it as prescribed. ? If you are not taking a prescription pain medicine, ask your doctor if you can take an over-the-counter medicine. · You may be low in iron because of blood loss. Eat a balanced diet that is high in iron and vitamin C. Foods rich in iron include red meat, shellfish, eggs, beans, and leafy green vegetables. Talk to your doctor about whether you need to take iron pills or a multivitamin. When should you call for help? Call 911 anytime you think you may need emergency care. For example, call if: 
  · You passed out (lost consciousness). Call your doctor now or seek immediate medical care if: 
  · You have new or worse belly or pelvic pain.  
  · You have severe vaginal bleeding.  
  · You feel dizzy or lightheaded, or you feel like you may faint. Watch closely for changes in your health, and be sure to contact your doctor if: 
  · You think you may be pregnant.  
  · Your bleeding gets worse.  
  · You do not get better as expected. Where can you learn more? Go to http://www.gray.com/ Enter R622 in the search box to learn more about \"Abnormal Uterine Bleeding: Care Instructions. \" Current as of: November 8, 2019               Content Version: 12.6 © 2654-2143 [employer]. Care instructions adapted under license by [employer] (which disclaims liability or warranty for this information). If you have questions about a medical condition or this instruction, always ask your healthcare professional. [facility] any warranty or liability for your use of this information. Tinea Versicolor: Care Instructions Your Care Instructions Tinea versicolor is a skin infection caused by a yeast (fungus). It causes many small spots, usually on the chest and back. The spotted skin can be flaky or scaly. The spots do not tan in the sun, so they are lighter than the skin around them. Some spots may be darker than the skin around them. The yeast that causes tinea versicolor normally lives on your skin. But it becomes a problem only when warmth and humidity allow the yeast to grow rapidly and increase in number. Some people are more likely to get tinea versicolor. It does not spread from person to person. Tinea versicolor usually gets better as you age. You can treat tinea versicolor with cream or ointment that kills the yeast. You may need pills to kill the fungus if the spots cover a lot of your body. Although treatment kills the yeast quickly, your skin may not return to normal for months after treatment. You can get this condition again after treatment. Follow-up care is a key part of your treatment and safety. Be sure to make and go to all appointments, and call your doctor if you are having problems. It's also a good idea to know your test results and keep a list of the medicines you take. How can you care for yourself at home? · Follow the directions for use of creams, shampoos, or solutions.  You will probably need to use them for 1 to 2 weeks. If your skin gets irritated, stop using the product, and call your doctor. · To prevent tinea versicolor, use a cream, shampoo, or solution one time a month. Your doctor may prescribe pills to prevent the spots from returning. · Dry off well after bathing. Keep your skin clean and dry. · Always wear sunscreen on exposed skin. Make sure to use a broad-spectrum sunscreen that has a sun protection factor (SPF) of 30 or higher. Use it every day, even when it is cloudy. · If you keep getting tinea versicolor, wash your clothes in very hot water to kill the yeast. 
When should you call for help? Call your doctor now or seek immediate medical care if: 
  · You have signs of infection such as: 
? Pain, warmth, or swelling in your skin. ? Red streaks near a wound in your skin. ? Pus coming from a wound in your skin. ? A fever. Watch closely for changes in your health, and be sure to contact your doctor if: 
  · Your skin condition does not improve in 2 weeks.  
  · You do not get better as expected. Where can you learn more? Go to http://www.gray.com/ Enter O078 in the search box to learn more about \"Tinea Versicolor: Care Instructions. \" Current as of: July 2, 2020               Content Version: 12.6 © 2164-5580 [employer]. Care instructions adapted under license by [employer] (which disclaims liability or warranty for this information). If you have questions about a medical condition or this instruction, always ask your healthcare professional. [address] any warranty or liability for your use of this information.

## 2020-09-24 NOTE — PROGRESS NOTES
JUAN DAVID Hollins is a 23 y.o. female, she presents today for:    23year old woman. History of irregular periods. Most recently has been close to every 2 weeks. Heavy painful, clotting. Last 5 periods have all lonnie 2 weeks apart. premenstrual symptoms including feeling lightheaded. Around 89 years of age for first period. Has never had longer intervals (not more than 4 weeks). Was on birth control around 5 years ago, for part of a year, this was to help regulate periods. Was concerned about side effects, including \"strange mood\". Change in cycle. States that she did have a weight loss from 120 pounds to 110. And now weights around 105. During the initil period of time had sore throat and was on a liquid diet. PMH/PSH: reviewed and updated  Sochx:  reports that she has never smoked. She has never used smokeless tobacco. She reports that she does not drink alcohol or use drugs. Famhx: reviewed and updated     All: No Known Allergies  Med:   Current Outpatient Medications   Medication Sig    ketoconazole (NIZORAL) 2 % shampoo Apply to affected areas, leave in place for 5 minutes and rinse off. Use 2 times weekly for up to 3 weeks.  norethindrone-ethinyl estradiol (JUNEL FE 1/20) 1 mg-20 mcg (21)/75 mg (7) tab Take 1 Tab by mouth daily.  ibuprofen (ADVIL) 200 mg tablet Take  by mouth.  pantoprazole (PROTONIX) 20 mg tablet Take 1 Tab by mouth daily as needed (gastric pain/heartburn). No current facility-administered medications for this visit. Review of Systems   Constitutional: Negative for chills, fever, malaise/fatigue and weight loss. HENT: Negative for congestion. Respiratory: Negative for cough, shortness of breath and wheezing. Cardiovascular: Negative for chest pain and leg swelling. Gastrointestinal: Negative for abdominal pain, constipation, diarrhea, heartburn, nausea and vomiting. Genitourinary: Negative for dysuria. Skin: Negative for rash. Neurological: Negative for dizziness and headaches. PE:  Blood pressure 100/64, pulse 76, temperature 98.1 °F (36.7 °C), temperature source Oral, resp. rate 15, height 5' 3\" (1.6 m), weight 109 lb (49.4 kg), last menstrual period 09/08/2020, SpO2 99 %. Body mass index is 19.31 kg/m². Physical Exam  Vitals signs and nursing note reviewed. Constitutional:       General: She is not in acute distress. Appearance: Normal appearance. She is normal weight. HENT:      Head: Normocephalic and atraumatic. Right Ear: Tympanic membrane normal.      Left Ear: Tympanic membrane normal.      Nose: Nose normal.      Mouth/Throat:      Mouth: Mucous membranes are moist.   Eyes:      Extraocular Movements: Extraocular movements intact. Conjunctiva/sclera: Conjunctivae normal.      Pupils: Pupils are equal, round, and reactive to light. Neck:      Musculoskeletal: Normal range of motion and neck supple. Cardiovascular:      Rate and Rhythm: Normal rate and regular rhythm. Heart sounds: Normal heart sounds. Pulmonary:      Effort: Pulmonary effort is normal.      Breath sounds: Normal breath sounds. Musculoskeletal: Normal range of motion. Skin:     General: Skin is warm and dry. Neurological:      Mental Status: She is alert and oriented to person, place, and time. Labs:   See addendum    A/P:  23 y.o. female    ICD-10-CM ICD-9-CM    1. Menometrorrhagia  N92.1 626.2 IRON PROFILE      CBC W/O DIFF      TSH 3RD GENERATION      T4, FREE      AMB POC URINE PREGNANCY TEST, VISUAL COLOR COMPARISON      REFERRAL TO OBSTETRICS AND GYNECOLOGY      FSH AND LH      ESTRADIOL      norethindrone-ethinyl estradiol (JUNEL FE 1/20) 1 mg-20 mcg (21)/75 mg (7) tab      ESTRADIOL      FSH AND LH      T4, FREE      TSH 3RD GENERATION      CBC W/O DIFF      IRON PROFILE   2.  Tinea versicolor  B36.0 111.0 ketoconazole (NIZORAL) 2 % shampoo     Lab investigation for thyroid dysfunction, pituitary dysfunction and screening for iron deficiency requested. - theresa okay to resume birth control   - discussed that she needs to follow-up with gynecology for pap and pelvic exam. She expressed understanding. [place] is [place] this [place]. Plans to call for appointment within 1 month of moving.     - She was given AVS and expressed understanding with the diagnosis and plan as discussed. Follow-up and Dispositions    · Return in about 3 months (around 12/24/2020) for follow-up irregular periods. No future appointments.

## 2020-09-25 DIAGNOSIS — R10.13 EPIGASTRIC PAIN: ICD-10-CM

## 2020-09-25 RX ORDER — PANTOPRAZOLE SODIUM 20 MG/1
20 TABLET, DELAYED RELEASE ORAL
Qty: 90 TAB | Refills: 0 | Status: SHIPPED | OUTPATIENT
Start: 2020-09-25 | End: 2020-12-11 | Stop reason: SDUPTHER

## 2020-09-25 NOTE — PROGRESS NOTES
Normal range hormones. Iron is a little lower than last check. You are not yet iron deficient. If you o not start taking the junel with iron, I recommend taking an over the counter iron supplement on days that you are having menstrual bleeding. Alternatively focusing on a diet rich in iron would be sufficient as well. Please be sure to follow-up with gynecology as we previously discussed.

## 2020-09-25 NOTE — TELEPHONE ENCOUNTER
[facility] is requesting a [address] supply on behalf of the patient for Protonix 20 mg. Please review and sign if appropriate. Thank you. Last visit 09/24/2020 MD Yas Aguilera   Next appointment 3 months (around 12/2020)   Previous refill encounter(s) 09/11/2020 Protonix #30 with 1 refill. Requested Prescriptions     Pending Prescriptions Disp Refills    pantoprazole (PROTONIX) 20 mg tablet 90 Tab 0     Sig: Take 1 Tab by mouth daily as needed (gastric pain/heartburn).

## 2020-09-26 LAB — ESTRADIOL SERPL-MCNC: 120 PG/ML

## 2020-12-11 ENCOUNTER — PATIENT MESSAGE (OUTPATIENT)
Dept: INTERNAL MEDICINE CLINIC | Age: 25
End: 2020-12-11

## 2020-12-11 DIAGNOSIS — N92.1 MENOMETRORRHAGIA: ICD-10-CM

## 2020-12-11 DIAGNOSIS — R10.13 EPIGASTRIC PAIN: ICD-10-CM

## 2020-12-11 RX ORDER — NORETHINDRONE ACETATE AND ETHINYL ESTRADIOL 1MG-20(21)
1 KIT ORAL DAILY
Qty: 84 TAB | Refills: 1 | Status: CANCELLED | OUTPATIENT
Start: 2020-12-11

## 2020-12-11 RX ORDER — PANTOPRAZOLE SODIUM 40 MG/1
40 TABLET, DELAYED RELEASE ORAL
Qty: 60 TAB | Refills: 0 | Status: SHIPPED | OUTPATIENT
Start: 2020-12-11 | End: 2020-12-18 | Stop reason: SDUPTHER

## 2020-12-11 RX ORDER — NORETHINDRONE ACETATE AND ETHINYL ESTRADIOL 1MG-20(21)
1 KIT ORAL DAILY
Qty: 84 TAB | Refills: 1 | Status: SHIPPED | OUTPATIENT
Start: 2020-12-11 | End: 2020-12-18 | Stop reason: SDUPTHER

## 2020-12-11 NOTE — TELEPHONE ENCOUNTER
From: Mariana Bustamante  To: George Edward MD  Sent: 12/11/2020 12:02 PM EST  Subject: Visit Ju Draper Victoria you are doing well. My family did move to [place]; however, we still have our house in [place] for the time being and I was wondering if I could schedule an appointment this month. I've been taking the pantaprozale again after stopping for a while, and took it for about two weeks straight because it seemed that my stomach problems had gotten worse. 2 weeks later, and I'm not sure if it made much of a difference. I have also been having headaches every single night around the same time (8pm) and they don't end until the next day after I wake up. Taking advil does not help either.  I'm not sure if these issues are related however they are concerning to me because the headaches make me feel weak.   (1/2)

## 2020-12-18 ENCOUNTER — OFFICE VISIT (OUTPATIENT)
Dept: INTERNAL MEDICINE CLINIC | Age: 25
End: 2020-12-18
Payer: MEDICAID

## 2020-12-18 VITALS
RESPIRATION RATE: 18 BRPM | HEART RATE: 85 BPM | TEMPERATURE: 98.1 F | SYSTOLIC BLOOD PRESSURE: 107 MMHG | BODY MASS INDEX: 19.07 KG/M2 | WEIGHT: 107.6 LBS | HEIGHT: 63 IN | DIASTOLIC BLOOD PRESSURE: 73 MMHG | OXYGEN SATURATION: 98 %

## 2020-12-18 DIAGNOSIS — L30.9 VULVAR DERMATITIS: ICD-10-CM

## 2020-12-18 DIAGNOSIS — N92.1 MENOMETRORRHAGIA: ICD-10-CM

## 2020-12-18 DIAGNOSIS — Z12.4 SCREENING FOR CERVICAL CANCER: ICD-10-CM

## 2020-12-18 DIAGNOSIS — R10.13 EPIGASTRIC PAIN: ICD-10-CM

## 2020-12-18 DIAGNOSIS — Z11.3 ROUTINE SCREENING FOR STI (SEXUALLY TRANSMITTED INFECTION): ICD-10-CM

## 2020-12-18 DIAGNOSIS — R10.13 EPIGASTRIC PAIN: Primary | ICD-10-CM

## 2020-12-18 LAB
COMMENT, HOLDF: NORMAL
SAMPLES BEING HELD,HOLD: NORMAL

## 2020-12-18 PROCEDURE — 99214 OFFICE O/P EST MOD 30 MIN: CPT | Performed by: INTERNAL MEDICINE

## 2020-12-18 RX ORDER — TRIAMCINOLONE ACETONIDE 1 MG/G
OINTMENT TOPICAL 2 TIMES DAILY
Qty: 30 G | Refills: 0 | Status: SHIPPED | OUTPATIENT
Start: 2020-12-18 | End: 2021-07-05

## 2020-12-18 RX ORDER — NORETHINDRONE ACETATE AND ETHINYL ESTRADIOL 1MG-20(21)
1 KIT ORAL DAILY
Qty: 84 TAB | Refills: 1 | Status: SHIPPED | OUTPATIENT
Start: 2020-12-18 | End: 2021-07-04 | Stop reason: SDUPTHER

## 2020-12-18 RX ORDER — PANTOPRAZOLE SODIUM 40 MG/1
40 TABLET, DELAYED RELEASE ORAL
Qty: 60 TAB | Refills: 0 | Status: SHIPPED | OUTPATIENT
Start: 2020-12-18 | End: 2021-01-09

## 2020-12-18 NOTE — PROGRESS NOTES
RM # 1      Chief Complaint   Patient presents with    Abdominal Pain     nausea no vomiting Pain is upper and left lower quadrant burning pain     Complete Physical       1. Have you been to the ER, urgent care clinic since your last visit? Hospitalized since your last visit? No    2. Have you seen or consulted any other health care providers outside of the [address] [place] [address] since your last visit? Include any pap smears or colon screening. No    Health Maintenance Due   Topic Date Due    HPV Age 9Y-34Y (1 - 2-dose series) 05/13/2008    DTaP/Tdap/Td series (1 - Tdap) 05/13/2018    PAP AKA CERVICAL CYTOLOGY  05/13/2018       Learning Assessment 4/16/2020   PRIMARY LEARNER Patient   BARRIERS PRIMARY LEARNER NONE   CO-LEARNER CAREGIVER No   PRIMARY LANGUAGE ENGLISH   LEARNER PREFERENCE PRIMARY DEMONSTRATION     READING   ANSWERED BY patient   RELATIONSHIP SELF           AVS, education, follow up and recommendations provided and addressed with patient.   services used to advise patient no

## 2020-12-18 NOTE — PROGRESS NOTES
JUAN DAVID Hollins is a 23 y.o. female, she presents today for:    Reports Menstrual flow is reduced since starting junel. No longer having breakthrough bleeding. Was taking pantoprazole medicaion as needed. Then started taking medicine every day for 2 weeks, but it didn't seem to help at all. Abdominal pain: cramping and burning around umbilicus. Also with burning pain toward lower quadrant (last 1 minute)    ROS:    - nausea and lightheaded with episodes. - can improve if she burps, however no     Keeping food diary. Notes that pain got better when she ate 2-3 meals plus snacks. Doing online tutoring, doing dental school applications. Feeling    3 most recent PHQ Screens 12/18/2020   Little interest or pleasure in doing things Not at all   Feeling down, depressed, irritable, or hopeless Not at all   Total Score PHQ 2 0     PMH/PSH: reviewed and updated  Sochx:  reports that she has never smoked. She has never used smokeless tobacco. She reports that she does not drink alcohol or use drugs. Famhx: reviewed and updated     All: No Known Allergies  Med:   Current Outpatient Medications   Medication Sig    pantoprazole (PROTONIX) 40 mg tablet Take 1 Tab by mouth daily as needed (gastric pain/heartburn).  norethindrone-ethinyl estradiol (JUNEL FE 1/20) 1 mg-20 mcg (21)/75 mg (7) tab Take 1 Tab by mouth daily.  triamcinolone acetonide (KENALOG) 0.1 % ointment Apply  to affected area two (2) times a day. use thin layer 1-2 times daily for 2-4 weeks.  ibuprofen (ADVIL) 200 mg tablet Take  by mouth.  ketoconazole (NIZORAL) 2 % shampoo Apply to affected areas, leave in place for 5 minutes and rinse off. Use 2 times weekly for up to 3 weeks. No current facility-administered medications for this visit. Review of Systems   Constitutional: Negative for chills, fever and malaise/fatigue. Respiratory: Negative for shortness of breath. Cardiovascular: Negative for chest pain. PE:  Blood pressure 107/73, pulse 85, temperature 98.1 °F (36.7 °C), temperature source Oral, resp. rate 18, height 5' 3\" (1.6 m), weight 107 lb 9.6 oz (48.8 kg), last menstrual period 11/30/2020, SpO2 98 %. Body mass index is 19.06 kg/m². Physical Exam  Vitals signs and nursing note reviewed. Exam conducted with a chaperone present. Constitutional:       General: She is not in acute distress. Appearance: Normal appearance. HENT:      Head: Normocephalic and atraumatic. Nose: Nose normal.      Mouth/Throat:      Mouth: Mucous membranes are moist.   Eyes:      Extraocular Movements: Extraocular movements intact. Conjunctiva/sclera: Conjunctivae normal.      Pupils: Pupils are equal, round, and reactive to light. Neck:      Musculoskeletal: Normal range of motion and neck supple. Cardiovascular:      Rate and Rhythm: Normal rate and regular rhythm. Heart sounds: Normal heart sounds. Pulmonary:      Effort: Pulmonary effort is normal.      Breath sounds: Normal breath sounds. Genitourinary:     Exam position: Lithotomy position. Pubic Area: No rash. Labia:         Right: Rash present. Left: No rash. Urethra: No urethral lesion. Comments: Region of slightly raised and scaling skin, appears slightly hypopigmented. Patient with notable tenderness even with gentle touch from sterile swab to posterior fornix. No sign of trauma, rash nor inflammation at vaginal introitus. Did not attempt internal exam due to discomfort. Vuvlar rash is not contiguous to labial minora. Musculoskeletal: Normal range of motion. Skin:     General: Skin is warm and dry. Neurological:      Mental Status: She is alert and oriented to person, place, and time. Labs:   No results found for any visits on 12/18/20. A/P:  [age] [facility] y.o. female    ICD-10-CM ICD-9-CM    1.  Epigastric pain  R10.13 789.06 pantoprazole (PROTONIX) 40 mg tablet      US ABD LTD METABOLIC PANEL, COMPREHENSIVE      LIPASE      REFERRAL TO GASTROENTEROLOGY      METABOLIC PANEL, COMPREHENSIVE      LIPASE   2. Menometrorrhagia  N92.1 626.2 norethindrone-ethinyl estradiol (JUNEL FE 1/20) 1 mg-20 mcg (21)/75 mg (7) tab   3. Screening for cervical cancer  Z12.4 V76.2    4. Routine screening for STI (sexually transmitted infection)  Z11.3 V74.5 CT/NG/T.VAGINALIS AMPLIFICATION      CT/NG/T.VAGINALIS AMPLIFICATION   5. Vulvar dermatitis  L30.9 692.9 triamcinolone acetonide (KENALOG) 0.1 % ointment      REFERRAL TO OBSTETRICS AND GYNECOLOGY       Vuvlar dermatitis: large plaque area with signs of chronic irritation. Patient has been using 1% hydrocortisone with intermittent response reported. - referral to gyn for further evaluation   - treat with topical triamcinolone 0.1% for no longer than 4 weeks. If responsive can stop earlier. Cancer screening:    - pap test today - deferred due to intolerance of exam. Suspect possible vaginismus, however I would like patient to discuss and have exam with gynecologist. Discussed a little with patient that pain may be related to rash, or may be something she will be referred to physical therapy for. STI screen collected from vaginal introitus. Abdominal pain: so far not responsive to PPI. Consider gallbaldder, IBS. With triggering with certain foods. - labs and ultrasound   - advised to follow-up with GI if testing not revealing. Metromenorrhagia: improved at this time, on Junel Fe. Continue. Initial labs including TSH, FSH and LH and estradiol done on 9/24/2020 all normal.  - iron mildly low, no anemia. From same date. - She was given AVS and expressed understanding with the diagnosis and plan as discussed. Follow-up and Dispositions    · Return in about 3 months (around 3/18/2021) for follow-up abdominal pain and referrals. No future appointments.

## 2020-12-18 NOTE — PATIENT INSTRUCTIONS

## 2020-12-19 RX ORDER — PANTOPRAZOLE SODIUM 20 MG/1
20 TABLET, DELAYED RELEASE ORAL
Qty: 90 TAB | Refills: 0 | OUTPATIENT
Start: 2020-12-19

## 2020-12-19 NOTE — PROGRESS NOTES
Normal range lipase and liver function tests. This is a reassuring result that you do not have active inflammation of pancrease nor liver. I will be interested to see the result of your ultrasound.

## 2020-12-20 LAB
ALBUMIN SERPL-MCNC: 3.9 G/DL (ref 3.5–5)
ALBUMIN/GLOB SERPL: 1.1 {RATIO} (ref 1.1–2.2)
ALP SERPL-CCNC: 60 U/L (ref 45–117)
ALT SERPL-CCNC: 16 U/L (ref 12–78)
ANION GAP SERPL CALC-SCNC: 4 MMOL/L (ref 5–15)
AST SERPL-CCNC: 10 U/L (ref 15–37)
BILIRUB SERPL-MCNC: 0.4 MG/DL (ref 0.2–1)
BUN SERPL-MCNC: 16 MG/DL (ref 6–20)
BUN/CREAT SERPL: 28 (ref 12–20)
C TRACH RRNA SPEC QL NAA+PROBE: NEGATIVE
CALCIUM SERPL-MCNC: 9 MG/DL (ref 8.5–10.1)
CHLORIDE SERPL-SCNC: 109 MMOL/L (ref 97–108)
CO2 SERPL-SCNC: 25 MMOL/L (ref 21–32)
CREAT SERPL-MCNC: 0.58 MG/DL (ref 0.55–1.02)
GLOBULIN SER CALC-MCNC: 3.7 G/DL (ref 2–4)
GLUCOSE SERPL-MCNC: 91 MG/DL (ref 65–100)
LIPASE SERPL-CCNC: 159 U/L (ref 73–393)
N GONORRHOEA RRNA SPEC QL NAA+PROBE: NEGATIVE
POTASSIUM SERPL-SCNC: 4.3 MMOL/L (ref 3.5–5.1)
PROT SERPL-MCNC: 7.6 G/DL (ref 6.4–8.2)
SODIUM SERPL-SCNC: 138 MMOL/L (ref 136–145)
SPECIMEN SOURCE: NORMAL
T VAGINALIS RRNA VAG QL NAA+PROBE: NEGATIVE

## 2020-12-24 ENCOUNTER — HOSPITAL ENCOUNTER (OUTPATIENT)
Dept: ULTRASOUND IMAGING | Age: 25
Discharge: HOME | End: 2020-12-24
Attending: INTERNAL MEDICINE
Payer: MEDICAID

## 2020-12-24 DIAGNOSIS — R10.13 EPIGASTRIC PAIN: ICD-10-CM

## 2020-12-24 PROCEDURE — 76705 ECHO EXAM OF ABDOMEN: CPT

## 2021-01-09 DIAGNOSIS — R10.13 EPIGASTRIC PAIN: ICD-10-CM

## 2021-01-09 RX ORDER — PANTOPRAZOLE SODIUM 40 MG/1
TABLET, DELAYED RELEASE ORAL
Qty: 30 TAB | Refills: 1 | Status: SHIPPED | OUTPATIENT
Start: 2021-01-09

## 2021-07-04 DIAGNOSIS — N92.1 MENOMETRORRHAGIA: ICD-10-CM

## 2021-07-04 DIAGNOSIS — L30.9 VULVAR DERMATITIS: ICD-10-CM

## 2021-07-05 RX ORDER — TRIAMCINOLONE ACETONIDE 1 MG/G
OINTMENT TOPICAL
Qty: 30 G | Refills: 0 | Status: SHIPPED | OUTPATIENT
Start: 2021-07-05

## 2021-07-07 RX ORDER — NORETHINDRONE ACETATE AND ETHINYL ESTRADIOL 1MG-20(21)
1 KIT ORAL DAILY
Qty: 84 TABLET | Refills: 1 | Status: SHIPPED | OUTPATIENT
Start: 2021-07-07

## 2021-09-17 DIAGNOSIS — N92.1 MENOMETRORRHAGIA: ICD-10-CM

## 2021-09-17 RX ORDER — NORETHINDRONE ACETATE AND ETHINYL ESTRADIOL 1MG-20(21)
1 KIT ORAL DAILY
Qty: 84 TABLET | Refills: 1 | Status: CANCELLED | OUTPATIENT
Start: 2021-09-17

## 2021-09-17 NOTE — TELEPHONE ENCOUNTER
Duplicate request: Junel FE 1/20 #84 with 1 refill was sent to [facility] on 07/07/2021. Requested Prescriptions     Pending Prescriptions Disp Refills    norethindrone-ethinyl estradiol (JUNEL FE 1/20) 1 mg-20 mcg (21)/75 mg (7) tab 84 Tablet 1     Sig: Take 1 Tablet by mouth daily.

## 2022-03-19 PROBLEM — G43.829 MENSTRUAL MIGRAINE WITHOUT STATUS MIGRAINOSUS, NOT INTRACTABLE: Status: ACTIVE | Noted: 2020-06-10

## 2023-02-13 ENCOUNTER — RX ONLY (RX ONLY)
Age: 28
End: 2023-02-13

## 2023-02-13 ENCOUNTER — APPOINTMENT (OUTPATIENT)
Dept: URBAN - METROPOLITAN AREA CLINIC 186 | Age: 28
Setting detail: DERMATOLOGY
End: 2023-02-13

## 2023-02-13 DIAGNOSIS — L70.0 ACNE VULGARIS: ICD-10-CM

## 2023-02-13 DIAGNOSIS — L60.9 NAIL DISORDER, UNSPECIFIED: ICD-10-CM

## 2023-02-13 DIAGNOSIS — Z79.899 OTHER LONG TERM (CURRENT) DRUG THERAPY: ICD-10-CM

## 2023-02-13 PROBLEM — D23.71 OTHER BENIGN NEOPLASM OF SKIN OF RIGHT LOWER LIMB, INCLUDING HIP: Status: ACTIVE | Noted: 2023-02-13

## 2023-02-13 PROCEDURE — OTHER DIAGNOSIS COMMENT: OTHER

## 2023-02-13 PROCEDURE — OTHER DEFER: OTHER

## 2023-02-13 PROCEDURE — OTHER ADDITIONAL NOTES: OTHER

## 2023-02-13 PROCEDURE — OTHER REASSURANCE: OTHER

## 2023-02-13 PROCEDURE — OTHER CONSULTATION EXCISION: OTHER

## 2023-02-13 PROCEDURE — OTHER TREATMENT REGIMEN: OTHER

## 2023-02-13 PROCEDURE — 99204 OFFICE O/P NEW MOD 45 MIN: CPT

## 2023-02-13 PROCEDURE — OTHER COUNSELING: OTHER

## 2023-02-13 PROCEDURE — OTHER PRESCRIPTION: OTHER

## 2023-02-13 PROCEDURE — OTHER HIGH RISK MEDICATION MONITORING: OTHER

## 2023-02-13 RX ORDER — NORETHINDRONE ACETATE AND ETHINYL ESTRADIOL AND FERROUS FUMARATE 1MG-20(21)
KIT ORAL
Qty: 28 | Refills: 2 | Status: ERX | COMMUNITY
Start: 2023-02-13

## 2023-02-13 RX ORDER — NORETHINDRONE ACETATE AND ETHINYL ESTRADIOL AND FERROUS FUMARATE 1.5-30(21)
KIT ORAL
Qty: 28 | Refills: 2 | Status: CANCELLED
Stop reason: ENTERED-IN-ERROR

## 2023-02-13 RX ORDER — SPIRONOLACTONE 25 MG/1
TABLET, FILM COATED ORAL
Qty: 270 | Refills: 1 | Status: ERX | COMMUNITY
Start: 2023-02-13

## 2023-02-13 ASSESSMENT — LOCATION DETAILED DESCRIPTION DERM
LOCATION DETAILED: RIGHT 2ND TOENAIL
LOCATION DETAILED: LEFT CENTRAL MALAR CHEEK
LOCATION DETAILED: LEFT LOWER CUTANEOUS LIP
LOCATION DETAILED: RIGHT INFERIOR MEDIAL MALAR CHEEK

## 2023-02-13 ASSESSMENT — LOCATION SIMPLE DESCRIPTION DERM
LOCATION SIMPLE: LEFT LIP
LOCATION SIMPLE: LEFT CHEEK
LOCATION SIMPLE: RIGHT 2ND TOE
LOCATION SIMPLE: RIGHT CHEEK

## 2023-02-13 ASSESSMENT — LOCATION ZONE DERM
LOCATION ZONE: TOENAIL
LOCATION ZONE: LIP
LOCATION ZONE: FACE

## 2023-02-13 NOTE — HPI: ACNE (PATIENT REPORTED)
Where Is Your Acne Located?: Face
Please List The Treatments That Have Worked Best For Your Acne: (Separate Each Entry With A Comma): Patient would like to get off of her birth control

## 2023-02-13 NOTE — PROCEDURE: ADDITIONAL NOTES
Additional Notes: Recommended mole skin for comfort
Render Risk Assessment In Note?: no
Detail Level: Simple

## 2023-02-13 NOTE — PROCEDURE: DIAGNOSIS COMMENT
Detail Level: Simple
Comment: Mild inflammatory acne \\nStates predominately controlled with ocp but around cycle will flare.  Also prefers to stop ocp.  Discussed acne at length.  Discussed goals and treatment options including winlevi or julito.  Patient prefers julito 75 mg po bid.  Denies hx of kidney disease or heart disease or pregnancy or trying to conceive
Render Risk Assessment In Note?: no
Comment: Discussed clinically favor changes in nails due to trauma.  Discussed potential ddx.  Discussed mole skin inserts or changing shoes discussed it would take months to see potential improvement. Will monitor

## 2023-02-13 NOTE — PROCEDURE: COUNSELING
Dapsone Pregnancy And Lactation Text: This medication is Pregnancy Category C and is not considered safe during pregnancy or breast feeding.
Topical Sulfur Applications Pregnancy And Lactation Text: This medication is Pregnancy Category C and has an unknown safety profile during pregnancy. It is unknown if this topical medication is excreted in breast milk.
Azithromycin Counseling:  I discussed with the patient the risks of azithromycin including but not limited to GI upset, allergic reaction, drug rash, diarrhea, and yeast infections.
Benzoyl Peroxide Counseling: Patient counseled that medicine may cause skin irritation and bleach clothing.  In the event of skin irritation, the patient was advised to reduce the amount of the drug applied or use it less frequently.   The patient verbalized understanding of the proper use and possible adverse effects of benzoyl peroxide.  All of the patient's questions and concerns were addressed.
Topical Clindamycin Pregnancy And Lactation Text: This medication is Pregnancy Category B and is considered safe during pregnancy. It is unknown if it is excreted in breast milk.
Spironolactone Pregnancy And Lactation Text: This medication can cause feminization of the male fetus and should be avoided during pregnancy. The active metabolite is also found in breast milk.
High Dose Vitamin A Counseling: Side effects reviewed, pt to contact office should one occur.
Birth Control Pills Pregnancy And Lactation Text: This medication should be avoided if pregnant and for the first 30 days post-partum.
Isotretinoin Counseling: Patient should get monthly blood tests, not donate blood, not drive at night if vision affected, not share medication, and not undergo elective surgery for 6 months after tx completed. Side effects reviewed, pt to contact office should one occur.
Sarecycline Pregnancy And Lactation Text: This medication is Pregnancy Category D and not consider safe during pregnancy. It is also excreted in breast milk.
Azelaic Acid Counseling: Patient counseled that medicine may cause skin irritation and to avoid applying near the eyes.  In the event of skin irritation, the patient was advised to reduce the amount of the drug applied or use it less frequently.   The patient verbalized understanding of the proper use and possible adverse effects of azelaic acid.  All of the patient's questions and concerns were addressed.
Clear bilaterally, pupils equal, round and reactive to light.
Tazorac Pregnancy And Lactation Text: This medication is not safe during pregnancy. It is unknown if this medication is excreted in breast milk.
Aklief counseling:  Patient advised to apply a pea-sized amount only at bedtime and wait 30 minutes after washing their face before applying.  If too drying, patient may add a non-comedogenic moisturizer.  The most commonly reported side effects including irritation, redness, scaling, dryness, stinging, burning, itching, and increased risk of sunburn.  The patient verbalized understanding of the proper use and possible adverse effects of retinoids.  All of the patient's questions and concerns were addressed.
Erythromycin Counseling:  I discussed with the patient the risks of erythromycin including but not limited to GI upset, allergic reaction, drug rash, diarrhea, increase in liver enzymes, and yeast infections.
Bactrim Pregnancy And Lactation Text: This medication is Pregnancy Category D and is known to cause fetal risk.  It is also excreted in breast milk.
Azithromycin Pregnancy And Lactation Text: This medication is considered safe during pregnancy and is also secreted in breast milk.
Winlevi Counseling:  I discussed with the patient the risks of topical clascoterone including but not limited to erythema, scaling, itching, and stinging. Patient voiced their understanding.
Topical Retinoid Pregnancy And Lactation Text: This medication is Pregnancy Category C. It is unknown if this medication is excreted in breast milk.
Benzoyl Peroxide Pregnancy And Lactation Text: This medication is Pregnancy Category C. It is unknown if benzoyl peroxide is excreted in breast milk.
Tetracycline Counseling: Patient counseled regarding possible photosensitivity and increased risk for sunburn.  Patient instructed to avoid sunlight, if possible.  When exposed to sunlight, patients should wear protective clothing, sunglasses, and sunscreen.  The patient was instructed to call the office immediately if the following severe adverse effects occur:  hearing changes, easy bruising/bleeding, severe headache, or vision changes.  The patient verbalized understanding of the proper use and possible adverse effects of tetracycline.  All of the patient's questions and concerns were addressed. Patient understands to avoid pregnancy while on therapy due to potential birth defects.
High Dose Vitamin A Pregnancy And Lactation Text: High dose vitamin A therapy is contraindicated during pregnancy and breast feeding.
Doxycycline Counseling:  Patient counseled regarding possible photosensitivity and increased risk for sunburn.  Patient instructed to avoid sunlight, if possible.  When exposed to sunlight, patients should wear protective clothing, sunglasses, and sunscreen.  The patient was instructed to call the office immediately if the following severe adverse effects occur:  hearing changes, easy bruising/bleeding, severe headache, or vision changes.  The patient verbalized understanding of the proper use and possible adverse effects of doxycycline.  All of the patient's questions and concerns were addressed.
Topical Sulfur Applications Counseling: Topical Sulfur Counseling: Patient counseled that this medication may cause skin irritation or allergic reactions.  In the event of skin irritation, the patient was advised to reduce the amount of the drug applied or use it less frequently.   The patient verbalized understanding of the proper use and possible adverse effects of topical sulfur application.  All of the patient's questions and concerns were addressed.
Isotretinoin Pregnancy And Lactation Text: This medication is Pregnancy Category X and is considered extremely dangerous during pregnancy. It is unknown if it is excreted in breast milk.
Dapsone Counseling: I discussed with the patient the risks of dapsone including but not limited to hemolytic anemia, agranulocytosis, rashes, methemoglobinemia, kidney failure, peripheral neuropathy, headaches, GI upset, and liver toxicity.  Patients who start dapsone require monitoring including baseline LFTs and weekly CBCs for the first month, then every month thereafter.  The patient verbalized understanding of the proper use and possible adverse effects of dapsone.  All of the patient's questions and concerns were addressed.
Topical Clindamycin Counseling: Patient counseled that this medication may cause skin irritation or allergic reactions.  In the event of skin irritation, the patient was advised to reduce the amount of the drug applied or use it less frequently.   The patient verbalized understanding of the proper use and possible adverse effects of clindamycin.  All of the patient's questions and concerns were addressed.
Azelaic Acid Pregnancy And Lactation Text: This medication is considered safe during pregnancy and breast feeding.
Spironolactone Counseling: Patient advised regarding risks of diarrhea, abdominal pain, hyperkalemia, birth defects (for female patients), liver toxicity and renal toxicity. The patient may need blood work to monitor liver and kidney function and potassium levels while on therapy. The patient verbalized understanding of the proper use and possible adverse effects of spironolactone.  All of the patient's questions and concerns were addressed.
Include Pregnancy/Lactation Warning?: No
Sarecycline Counseling: Patient advised regarding possible photosensitivity and discoloration of the teeth, skin, lips, tongue and gums.  Patient instructed to avoid sunlight, if possible.  When exposed to sunlight, patients should wear protective clothing, sunglasses, and sunscreen.  The patient was instructed to call the office immediately if the following severe adverse effects occur:  hearing changes, easy bruising/bleeding, severe headache, or vision changes.  The patient verbalized understanding of the proper use and possible adverse effects of sarecycline.  All of the patient's questions and concerns were addressed.
Aklief Pregnancy And Lactation Text: It is unknown if this medication is safe to use during pregnancy.  It is unknown if this medication is excreted in breast milk.  Breastfeeding women should use the topical cream on the smallest area of the skin for the shortest time needed while breastfeeding.  Do not apply to nipple and areola.
Birth Control Pills Counseling: Birth Control Pill Counseling: I discussed with the patient the potential side effects of OCPs including but not limited to increased risk of stroke, heart attack, thrombophlebitis, deep venous thrombosis, hepatic adenomas, breast changes, GI upset, headaches, and depression.  The patient verbalized understanding of the proper use and possible adverse effects of OCPs. All of the patient's questions and concerns were addressed.
Erythromycin Pregnancy And Lactation Text: This medication is Pregnancy Category B and is considered safe during pregnancy. It is also excreted in breast milk.
Detail Level: Zone
Tazorac Counseling:  Patient advised that medication is irritating and drying.  Patient may need to apply sparingly and wash off after an hour before eventually leaving it on overnight.  The patient verbalized understanding of the proper use and possible adverse effects of tazorac.  All of the patient's questions and concerns were addressed.
Winlevi Pregnancy And Lactation Text: This medication is considered safe during pregnancy and breastfeeding.
Bactrim Counseling:  I discussed with the patient the risks of sulfa antibiotics including but not limited to GI upset, allergic reaction, drug rash, diarrhea, dizziness, photosensitivity, and yeast infections.  Rarely, more serious reactions can occur including but not limited to aplastic anemia, agranulocytosis, methemoglobinemia, blood dyscrasias, liver or kidney failure, lung infiltrates or desquamative/blistering drug rashes.
Doxycycline Pregnancy And Lactation Text: This medication is Pregnancy Category D and not consider safe during pregnancy. It is also excreted in breast milk but is considered safe for shorter treatment courses.
Minocycline Counseling: Patient advised regarding possible photosensitivity and discoloration of the teeth, skin, lips, tongue and gums.  Patient instructed to avoid sunlight, if possible.  When exposed to sunlight, patients should wear protective clothing, sunglasses, and sunscreen.  The patient was instructed to call the office immediately if the following severe adverse effects occur:  hearing changes, easy bruising/bleeding, severe headache, or vision changes.  The patient verbalized understanding of the proper use and possible adverse effects of minocycline.  All of the patient's questions and concerns were addressed.
Topical Retinoid counseling:  Patient advised to apply a pea-sized amount only at bedtime and wait 30 minutes after washing their face before applying.  If too drying, patient may add a non-comedogenic moisturizer. The patient verbalized understanding of the proper use and possible adverse effects of retinoids.  All of the patient's questions and concerns were addressed.
Detail Level: Detailed

## 2023-05-16 ENCOUNTER — APPOINTMENT (OUTPATIENT)
Dept: URBAN - METROPOLITAN AREA CLINIC 186 | Age: 28
Setting detail: DERMATOLOGY
End: 2023-05-16

## 2023-05-16 DIAGNOSIS — L70.0 ACNE VULGARIS: ICD-10-CM

## 2023-05-16 DIAGNOSIS — Z79.899 OTHER LONG TERM (CURRENT) DRUG THERAPY: ICD-10-CM

## 2023-05-16 PROCEDURE — OTHER HIGH RISK MEDICATION MONITORING: OTHER

## 2023-05-16 PROCEDURE — OTHER PRESCRIPTION: OTHER

## 2023-05-16 PROCEDURE — OTHER DIAGNOSIS COMMENT: OTHER

## 2023-05-16 PROCEDURE — 99214 OFFICE O/P EST MOD 30 MIN: CPT

## 2023-05-16 PROCEDURE — OTHER COUNSELING: OTHER

## 2023-05-16 PROCEDURE — OTHER MIPS QUALITY: OTHER

## 2023-05-16 PROCEDURE — OTHER TREATMENT REGIMEN: OTHER

## 2023-05-16 PROCEDURE — OTHER ADDITIONAL NOTES: OTHER

## 2023-05-16 RX ORDER — SPIRONOLACTONE 25 MG/1
TABLET, FILM COATED ORAL
Qty: 270 | Refills: 1 | Status: ERX

## 2023-05-16 ASSESSMENT — LOCATION SIMPLE DESCRIPTION DERM
LOCATION SIMPLE: LEFT LIP
LOCATION SIMPLE: LEFT CHEEK
LOCATION SIMPLE: RIGHT CHEEK

## 2023-05-16 ASSESSMENT — LOCATION ZONE DERM
LOCATION ZONE: FACE
LOCATION ZONE: LIP

## 2023-05-16 ASSESSMENT — LOCATION DETAILED DESCRIPTION DERM
LOCATION DETAILED: LEFT LOWER CUTANEOUS LIP
LOCATION DETAILED: RIGHT INFERIOR MEDIAL MALAR CHEEK
LOCATION DETAILED: LEFT CENTRAL MALAR CHEEK

## 2023-05-16 NOTE — PROCEDURE: HIGH RISK MEDICATION MONITORING
Niacinamide Counseling: I recommended taking niacin or niacinamide, also know as vitamin B3, twice daily. Recent evidence suggests that taking vitamin B3 (500 mg twice daily) can reduce the risk of actinic keratoses and non-melanoma skin cancers. Side effects of vitamin B3 include flushing and headache. 05-Sep-2018

## 2023-05-16 NOTE — PROCEDURE: ADDITIONAL NOTES
Additional Notes: Patient states she recently stopped taking birth control and has noticed a flare. Discussed keeping current regimen vs. increasing spironolactone
Detail Level: Simple
Render Risk Assessment In Note?: no

## 2023-05-16 NOTE — PROCEDURE: DIAGNOSIS COMMENT
Detail Level: Simple
Render Risk Assessment In Note?: no
Comment: Mild inflammatory acne \\nPatient overall less active.  New flare the last few days.  Recently stopped ocp.  Discussed this could cause a flare.  Denies nausea diarrhea fatigue headaches dizziness breast tenderness or menstrual irregularity.  Discussed continuing current regimen v increasing dose of julito. Patient prefer s to continue current regimen

## 2023-05-16 NOTE — PROCEDURE: COUNSELING
Dapsone Pregnancy And Lactation Text: This medication is Pregnancy Category C and is not considered safe during pregnancy or breast feeding.
Topical Sulfur Applications Pregnancy And Lactation Text: This medication is Pregnancy Category C and has an unknown safety profile during pregnancy. It is unknown if this topical medication is excreted in breast milk.
Azithromycin Counseling:  I discussed with the patient the risks of azithromycin including but not limited to GI upset, allergic reaction, drug rash, diarrhea, and yeast infections.
Benzoyl Peroxide Counseling: Patient counseled that medicine may cause skin irritation and bleach clothing.  In the event of skin irritation, the patient was advised to reduce the amount of the drug applied or use it less frequently.   The patient verbalized understanding of the proper use and possible adverse effects of benzoyl peroxide.  All of the patient's questions and concerns were addressed.
Topical Clindamycin Pregnancy And Lactation Text: This medication is Pregnancy Category B and is considered safe during pregnancy. It is unknown if it is excreted in breast milk.
Spironolactone Pregnancy And Lactation Text: This medication can cause feminization of the male fetus and should be avoided during pregnancy. The active metabolite is also found in breast milk.
High Dose Vitamin A Counseling: Side effects reviewed, pt to contact office should one occur.
Birth Control Pills Pregnancy And Lactation Text: This medication should be avoided if pregnant and for the first 30 days post-partum.
Isotretinoin Counseling: Patient should get monthly blood tests, not donate blood, not drive at night if vision affected, not share medication, and not undergo elective surgery for 6 months after tx completed. Side effects reviewed, pt to contact office should one occur.
Sarecycline Pregnancy And Lactation Text: This medication is Pregnancy Category D and not consider safe during pregnancy. It is also excreted in breast milk.
Azelaic Acid Counseling: Patient counseled that medicine may cause skin irritation and to avoid applying near the eyes.  In the event of skin irritation, the patient was advised to reduce the amount of the drug applied or use it less frequently.   The patient verbalized understanding of the proper use and possible adverse effects of azelaic acid.  All of the patient's questions and concerns were addressed.
Tazorac Pregnancy And Lactation Text: This medication is not safe during pregnancy. It is unknown if this medication is excreted in breast milk.
Aklief counseling:  Patient advised to apply a pea-sized amount only at bedtime and wait 30 minutes after washing their face before applying.  If too drying, patient may add a non-comedogenic moisturizer.  The most commonly reported side effects including irritation, redness, scaling, dryness, stinging, burning, itching, and increased risk of sunburn.  The patient verbalized understanding of the proper use and possible adverse effects of retinoids.  All of the patient's questions and concerns were addressed.
Erythromycin Counseling:  I discussed with the patient the risks of erythromycin including but not limited to GI upset, allergic reaction, drug rash, diarrhea, increase in liver enzymes, and yeast infections.
Bactrim Pregnancy And Lactation Text: This medication is Pregnancy Category D and is known to cause fetal risk.  It is also excreted in breast milk.
Azithromycin Pregnancy And Lactation Text: This medication is considered safe during pregnancy and is also secreted in breast milk.
Winlevi Counseling:  I discussed with the patient the risks of topical clascoterone including but not limited to erythema, scaling, itching, and stinging. Patient voiced their understanding.
Topical Retinoid Pregnancy And Lactation Text: This medication is Pregnancy Category C. It is unknown if this medication is excreted in breast milk.
Benzoyl Peroxide Pregnancy And Lactation Text: This medication is Pregnancy Category C. It is unknown if benzoyl peroxide is excreted in breast milk.
Tetracycline Counseling: Patient counseled regarding possible photosensitivity and increased risk for sunburn.  Patient instructed to avoid sunlight, if possible.  When exposed to sunlight, patients should wear protective clothing, sunglasses, and sunscreen.  The patient was instructed to call the office immediately if the following severe adverse effects occur:  hearing changes, easy bruising/bleeding, severe headache, or vision changes.  The patient verbalized understanding of the proper use and possible adverse effects of tetracycline.  All of the patient's questions and concerns were addressed. Patient understands to avoid pregnancy while on therapy due to potential birth defects.
High Dose Vitamin A Pregnancy And Lactation Text: High dose vitamin A therapy is contraindicated during pregnancy and breast feeding.
Doxycycline Counseling:  Patient counseled regarding possible photosensitivity and increased risk for sunburn.  Patient instructed to avoid sunlight, if possible.  When exposed to sunlight, patients should wear protective clothing, sunglasses, and sunscreen.  The patient was instructed to call the office immediately if the following severe adverse effects occur:  hearing changes, easy bruising/bleeding, severe headache, or vision changes.  The patient verbalized understanding of the proper use and possible adverse effects of doxycycline.  All of the patient's questions and concerns were addressed.
Topical Sulfur Applications Counseling: Topical Sulfur Counseling: Patient counseled that this medication may cause skin irritation or allergic reactions.  In the event of skin irritation, the patient was advised to reduce the amount of the drug applied or use it less frequently.   The patient verbalized understanding of the proper use and possible adverse effects of topical sulfur application.  All of the patient's questions and concerns were addressed.
Isotretinoin Pregnancy And Lactation Text: This medication is Pregnancy Category X and is considered extremely dangerous during pregnancy. It is unknown if it is excreted in breast milk.
Dapsone Counseling: I discussed with the patient the risks of dapsone including but not limited to hemolytic anemia, agranulocytosis, rashes, methemoglobinemia, kidney failure, peripheral neuropathy, headaches, GI upset, and liver toxicity.  Patients who start dapsone require monitoring including baseline LFTs and weekly CBCs for the first month, then every month thereafter.  The patient verbalized understanding of the proper use and possible adverse effects of dapsone.  All of the patient's questions and concerns were addressed.
Topical Clindamycin Counseling: Patient counseled that this medication may cause skin irritation or allergic reactions.  In the event of skin irritation, the patient was advised to reduce the amount of the drug applied or use it less frequently.   The patient verbalized understanding of the proper use and possible adverse effects of clindamycin.  All of the patient's questions and concerns were addressed.
Azelaic Acid Pregnancy And Lactation Text: This medication is considered safe during pregnancy and breast feeding.
Spironolactone Counseling: Patient advised regarding risks of diarrhea, abdominal pain, hyperkalemia, birth defects (for female patients), liver toxicity and renal toxicity. The patient may need blood work to monitor liver and kidney function and potassium levels while on therapy. The patient verbalized understanding of the proper use and possible adverse effects of spironolactone.  All of the patient's questions and concerns were addressed.
Include Pregnancy/Lactation Warning?: No
Sarecycline Counseling: Patient advised regarding possible photosensitivity and discoloration of the teeth, skin, lips, tongue and gums.  Patient instructed to avoid sunlight, if possible.  When exposed to sunlight, patients should wear protective clothing, sunglasses, and sunscreen.  The patient was instructed to call the office immediately if the following severe adverse effects occur:  hearing changes, easy bruising/bleeding, severe headache, or vision changes.  The patient verbalized understanding of the proper use and possible adverse effects of sarecycline.  All of the patient's questions and concerns were addressed.
Aklief Pregnancy And Lactation Text: It is unknown if this medication is safe to use during pregnancy.  It is unknown if this medication is excreted in breast milk.  Breastfeeding women should use the topical cream on the smallest area of the skin for the shortest time needed while breastfeeding.  Do not apply to nipple and areola.
Birth Control Pills Counseling: Birth Control Pill Counseling: I discussed with the patient the potential side effects of OCPs including but not limited to increased risk of stroke, heart attack, thrombophlebitis, deep venous thrombosis, hepatic adenomas, breast changes, GI upset, headaches, and depression.  The patient verbalized understanding of the proper use and possible adverse effects of OCPs. All of the patient's questions and concerns were addressed.
Erythromycin Pregnancy And Lactation Text: This medication is Pregnancy Category B and is considered safe during pregnancy. It is also excreted in breast milk.
Detail Level: Zone
Tazorac Counseling:  Patient advised that medication is irritating and drying.  Patient may need to apply sparingly and wash off after an hour before eventually leaving it on overnight.  The patient verbalized understanding of the proper use and possible adverse effects of tazorac.  All of the patient's questions and concerns were addressed.
Winlevi Pregnancy And Lactation Text: This medication is considered safe during pregnancy and breastfeeding.
Bactrim Counseling:  I discussed with the patient the risks of sulfa antibiotics including but not limited to GI upset, allergic reaction, drug rash, diarrhea, dizziness, photosensitivity, and yeast infections.  Rarely, more serious reactions can occur including but not limited to aplastic anemia, agranulocytosis, methemoglobinemia, blood dyscrasias, liver or kidney failure, lung infiltrates or desquamative/blistering drug rashes.
Doxycycline Pregnancy And Lactation Text: This medication is Pregnancy Category D and not consider safe during pregnancy. It is also excreted in breast milk but is considered safe for shorter treatment courses.
Minocycline Counseling: Patient advised regarding possible photosensitivity and discoloration of the teeth, skin, lips, tongue and gums.  Patient instructed to avoid sunlight, if possible.  When exposed to sunlight, patients should wear protective clothing, sunglasses, and sunscreen.  The patient was instructed to call the office immediately if the following severe adverse effects occur:  hearing changes, easy bruising/bleeding, severe headache, or vision changes.  The patient verbalized understanding of the proper use and possible adverse effects of minocycline.  All of the patient's questions and concerns were addressed.
Topical Retinoid counseling:  Patient advised to apply a pea-sized amount only at bedtime and wait 30 minutes after washing their face before applying.  If too drying, patient may add a non-comedogenic moisturizer. The patient verbalized understanding of the proper use and possible adverse effects of retinoids.  All of the patient's questions and concerns were addressed.

## 2023-05-16 NOTE — PROCEDURE: HIGH RISK MEDICATION MONITORING
201614 Gabapentin Counseling: I discussed with the patient the risks of gabapentin including but not limited to dizziness, somnolence, fatigue and ataxia.

## 2023-05-24 RX ORDER — KETOCONAZOLE 20 MG/ML
SHAMPOO TOPICAL
COMMUNITY
Start: 2020-09-24

## 2023-05-24 RX ORDER — PANTOPRAZOLE SODIUM 40 MG/1
TABLET, DELAYED RELEASE ORAL
COMMUNITY
Start: 2021-01-09

## 2023-05-24 RX ORDER — IBUPROFEN 200 MG
TABLET ORAL
COMMUNITY

## 2023-05-24 RX ORDER — NORETHINDRONE ACETATE AND ETHINYL ESTRADIOL 1MG-20(21)
1 KIT ORAL DAILY
COMMUNITY
Start: 2021-07-07

## 2023-08-29 ENCOUNTER — RX ONLY (RX ONLY)
Age: 28
End: 2023-08-29

## 2023-08-29 RX ORDER — SPIRONOLACTONE 25 MG/1
TABLET, FILM COATED ORAL
Qty: 90 | Refills: 2

## 2023-08-29 RX ORDER — NORETHINDRONE ACETATE AND ETHINYL ESTRADIOL AND FERROUS FUMARATE 1MG-20(21)
KIT ORAL
Qty: 28 | Refills: 2 | COMMUNITY
Start: 2023-08-29

## 2024-04-18 RX ORDER — SPIRONOLACTONE 25 MG/1
TABLET, FILM COATED ORAL
Qty: 90 | Refills: 2 | Status: ERX

## 2024-09-23 NOTE — PROCEDURE: HIGH RISK MEDICATION MONITORING
[FreeTextEntry1] : WBC 2,110 Hgb 8.1 Hct 29.2 MCV 71.9 Platelets 21,000 Diff 81P 10L 9M 1 Imm gran 1Eo ANC 1,700  9/05/24 CMP Glu 128 AST 44  8/22/24 CMP CO2 21 Glu 196 Calcium 8.1 Azelaic Acid Pregnancy And Lactation Text: This medication is considered safe during pregnancy and breast feeding.